# Patient Record
Sex: MALE | URBAN - METROPOLITAN AREA
[De-identification: names, ages, dates, MRNs, and addresses within clinical notes are randomized per-mention and may not be internally consistent; named-entity substitution may affect disease eponyms.]

---

## 2024-10-04 ENCOUNTER — ON CAMPUS - OUTPATIENT (AMBULATORY)
Age: 59
End: 2024-10-04
Payer: COMMERCIAL

## 2024-10-04 ENCOUNTER — OFFICE (AMBULATORY)
Age: 59
End: 2024-10-04
Payer: COMMERCIAL

## 2024-10-04 ENCOUNTER — OFFICE (AMBULATORY)
Dept: URBAN - METROPOLITAN AREA PATHOLOGY 19 | Facility: PATHOLOGY | Age: 59
End: 2024-10-04
Payer: COMMERCIAL

## 2024-10-04 ENCOUNTER — ON CAMPUS - OUTPATIENT (AMBULATORY)
Dept: URBAN - METROPOLITAN AREA HOSPITAL 2 | Facility: HOSPITAL | Age: 59
End: 2024-10-04
Payer: COMMERCIAL

## 2024-10-04 VITALS
HEIGHT: 70 IN | DIASTOLIC BLOOD PRESSURE: 87 MMHG | DIASTOLIC BLOOD PRESSURE: 84 MMHG | DIASTOLIC BLOOD PRESSURE: 90 MMHG | SYSTOLIC BLOOD PRESSURE: 141 MMHG | DIASTOLIC BLOOD PRESSURE: 94 MMHG | SYSTOLIC BLOOD PRESSURE: 141 MMHG | OXYGEN SATURATION: 98 % | DIASTOLIC BLOOD PRESSURE: 95 MMHG | HEART RATE: 95 BPM | SYSTOLIC BLOOD PRESSURE: 133 MMHG | HEART RATE: 69 BPM | OXYGEN SATURATION: 97 % | SYSTOLIC BLOOD PRESSURE: 140 MMHG | OXYGEN SATURATION: 94 % | HEART RATE: 93 BPM | DIASTOLIC BLOOD PRESSURE: 100 MMHG | HEART RATE: 69 BPM | SYSTOLIC BLOOD PRESSURE: 130 MMHG | DIASTOLIC BLOOD PRESSURE: 83 MMHG | SYSTOLIC BLOOD PRESSURE: 133 MMHG | SYSTOLIC BLOOD PRESSURE: 129 MMHG | WEIGHT: 315 LBS | DIASTOLIC BLOOD PRESSURE: 89 MMHG | HEART RATE: 96 BPM | DIASTOLIC BLOOD PRESSURE: 96 MMHG | DIASTOLIC BLOOD PRESSURE: 95 MMHG | HEART RATE: 89 BPM | DIASTOLIC BLOOD PRESSURE: 87 MMHG | SYSTOLIC BLOOD PRESSURE: 124 MMHG | HEART RATE: 98 BPM | DIASTOLIC BLOOD PRESSURE: 83 MMHG | HEART RATE: 98 BPM | RESPIRATION RATE: 20 BRPM | SYSTOLIC BLOOD PRESSURE: 154 MMHG | RESPIRATION RATE: 15 BRPM | RESPIRATION RATE: 19 BRPM | OXYGEN SATURATION: 94 % | HEART RATE: 96 BPM | HEIGHT: 70 IN | DIASTOLIC BLOOD PRESSURE: 93 MMHG | HEART RATE: 82 BPM | HEART RATE: 95 BPM | RESPIRATION RATE: 20 BRPM | RESPIRATION RATE: 19 BRPM | SYSTOLIC BLOOD PRESSURE: 154 MMHG | DIASTOLIC BLOOD PRESSURE: 93 MMHG | DIASTOLIC BLOOD PRESSURE: 94 MMHG | SYSTOLIC BLOOD PRESSURE: 141 MMHG | SYSTOLIC BLOOD PRESSURE: 135 MMHG | SYSTOLIC BLOOD PRESSURE: 144 MMHG | HEART RATE: 69 BPM | DIASTOLIC BLOOD PRESSURE: 94 MMHG | OXYGEN SATURATION: 96 % | RESPIRATION RATE: 17 BRPM | RESPIRATION RATE: 20 BRPM | SYSTOLIC BLOOD PRESSURE: 154 MMHG | DIASTOLIC BLOOD PRESSURE: 95 MMHG | HEART RATE: 95 BPM | SYSTOLIC BLOOD PRESSURE: 124 MMHG | SYSTOLIC BLOOD PRESSURE: 130 MMHG | HEART RATE: 90 BPM | RESPIRATION RATE: 16 BRPM | DIASTOLIC BLOOD PRESSURE: 92 MMHG | RESPIRATION RATE: 20 BRPM | SYSTOLIC BLOOD PRESSURE: 129 MMHG | DIASTOLIC BLOOD PRESSURE: 87 MMHG | RESPIRATION RATE: 15 BRPM | SYSTOLIC BLOOD PRESSURE: 151 MMHG | DIASTOLIC BLOOD PRESSURE: 92 MMHG | RESPIRATION RATE: 16 BRPM | RESPIRATION RATE: 19 BRPM | DIASTOLIC BLOOD PRESSURE: 84 MMHG | HEART RATE: 91 BPM | OXYGEN SATURATION: 97 % | HEART RATE: 91 BPM | HEART RATE: 95 BPM | SYSTOLIC BLOOD PRESSURE: 133 MMHG | HEART RATE: 91 BPM | HEART RATE: 86 BPM | OXYGEN SATURATION: 95 % | OXYGEN SATURATION: 95 % | OXYGEN SATURATION: 95 % | DIASTOLIC BLOOD PRESSURE: 95 MMHG | DIASTOLIC BLOOD PRESSURE: 90 MMHG | RESPIRATION RATE: 16 BRPM | DIASTOLIC BLOOD PRESSURE: 83 MMHG | HEART RATE: 69 BPM | OXYGEN SATURATION: 98 % | SYSTOLIC BLOOD PRESSURE: 139 MMHG | SYSTOLIC BLOOD PRESSURE: 151 MMHG | HEART RATE: 89 BPM | HEART RATE: 69 BPM | SYSTOLIC BLOOD PRESSURE: 144 MMHG | OXYGEN SATURATION: 98 % | DIASTOLIC BLOOD PRESSURE: 100 MMHG | DIASTOLIC BLOOD PRESSURE: 90 MMHG | HEART RATE: 96 BPM | SYSTOLIC BLOOD PRESSURE: 139 MMHG | SYSTOLIC BLOOD PRESSURE: 141 MMHG | RESPIRATION RATE: 15 BRPM | HEART RATE: 93 BPM | OXYGEN SATURATION: 96 % | DIASTOLIC BLOOD PRESSURE: 100 MMHG | TEMPERATURE: 97 F | RESPIRATION RATE: 18 BRPM | SYSTOLIC BLOOD PRESSURE: 124 MMHG | DIASTOLIC BLOOD PRESSURE: 96 MMHG | SYSTOLIC BLOOD PRESSURE: 141 MMHG | OXYGEN SATURATION: 97 % | HEART RATE: 82 BPM | OXYGEN SATURATION: 96 % | HEIGHT: 70 IN | HEART RATE: 89 BPM | HEART RATE: 98 BPM | SYSTOLIC BLOOD PRESSURE: 151 MMHG | HEART RATE: 91 BPM | HEART RATE: 82 BPM | OXYGEN SATURATION: 93 % | OXYGEN SATURATION: 94 % | DIASTOLIC BLOOD PRESSURE: 95 MMHG | DIASTOLIC BLOOD PRESSURE: 90 MMHG | DIASTOLIC BLOOD PRESSURE: 96 MMHG | RESPIRATION RATE: 20 BRPM | RESPIRATION RATE: 17 BRPM | RESPIRATION RATE: 19 BRPM | OXYGEN SATURATION: 96 % | OXYGEN SATURATION: 93 % | OXYGEN SATURATION: 98 % | RESPIRATION RATE: 17 BRPM | RESPIRATION RATE: 18 BRPM | OXYGEN SATURATION: 97 % | DIASTOLIC BLOOD PRESSURE: 93 MMHG | DIASTOLIC BLOOD PRESSURE: 84 MMHG | OXYGEN SATURATION: 95 % | SYSTOLIC BLOOD PRESSURE: 140 MMHG | HEART RATE: 89 BPM | DIASTOLIC BLOOD PRESSURE: 89 MMHG | DIASTOLIC BLOOD PRESSURE: 92 MMHG | RESPIRATION RATE: 14 BRPM | SYSTOLIC BLOOD PRESSURE: 129 MMHG | SYSTOLIC BLOOD PRESSURE: 130 MMHG | SYSTOLIC BLOOD PRESSURE: 151 MMHG | TEMPERATURE: 97 F | WEIGHT: 315 LBS | RESPIRATION RATE: 19 BRPM | SYSTOLIC BLOOD PRESSURE: 151 MMHG | DIASTOLIC BLOOD PRESSURE: 100 MMHG | HEART RATE: 86 BPM | HEART RATE: 95 BPM | SYSTOLIC BLOOD PRESSURE: 133 MMHG | RESPIRATION RATE: 14 BRPM | HEIGHT: 70 IN | RESPIRATION RATE: 20 BRPM | SYSTOLIC BLOOD PRESSURE: 135 MMHG | HEART RATE: 93 BPM | DIASTOLIC BLOOD PRESSURE: 82 MMHG | HEART RATE: 95 BPM | HEART RATE: 96 BPM | RESPIRATION RATE: 16 BRPM | TEMPERATURE: 97 F | HEART RATE: 93 BPM | HEART RATE: 89 BPM | RESPIRATION RATE: 18 BRPM | SYSTOLIC BLOOD PRESSURE: 140 MMHG | RESPIRATION RATE: 19 BRPM | OXYGEN SATURATION: 93 % | DIASTOLIC BLOOD PRESSURE: 100 MMHG | RESPIRATION RATE: 17 BRPM | HEART RATE: 89 BPM | DIASTOLIC BLOOD PRESSURE: 100 MMHG | WEIGHT: 315 LBS | SYSTOLIC BLOOD PRESSURE: 124 MMHG | HEIGHT: 70 IN | DIASTOLIC BLOOD PRESSURE: 100 MMHG | SYSTOLIC BLOOD PRESSURE: 124 MMHG | OXYGEN SATURATION: 97 % | DIASTOLIC BLOOD PRESSURE: 87 MMHG | DIASTOLIC BLOOD PRESSURE: 82 MMHG | SYSTOLIC BLOOD PRESSURE: 151 MMHG | DIASTOLIC BLOOD PRESSURE: 83 MMHG | SYSTOLIC BLOOD PRESSURE: 129 MMHG | DIASTOLIC BLOOD PRESSURE: 82 MMHG | TEMPERATURE: 97 F | RESPIRATION RATE: 18 BRPM | HEART RATE: 98 BPM | SYSTOLIC BLOOD PRESSURE: 144 MMHG | DIASTOLIC BLOOD PRESSURE: 83 MMHG | RESPIRATION RATE: 15 BRPM | SYSTOLIC BLOOD PRESSURE: 141 MMHG | DIASTOLIC BLOOD PRESSURE: 90 MMHG | SYSTOLIC BLOOD PRESSURE: 154 MMHG | OXYGEN SATURATION: 93 % | HEART RATE: 98 BPM | HEART RATE: 86 BPM | HEART RATE: 95 BPM | OXYGEN SATURATION: 93 % | RESPIRATION RATE: 15 BRPM | HEART RATE: 93 BPM | SYSTOLIC BLOOD PRESSURE: 140 MMHG | SYSTOLIC BLOOD PRESSURE: 130 MMHG | OXYGEN SATURATION: 95 % | OXYGEN SATURATION: 98 % | DIASTOLIC BLOOD PRESSURE: 87 MMHG | DIASTOLIC BLOOD PRESSURE: 82 MMHG | SYSTOLIC BLOOD PRESSURE: 133 MMHG | SYSTOLIC BLOOD PRESSURE: 135 MMHG | HEART RATE: 82 BPM | HEART RATE: 90 BPM | RESPIRATION RATE: 18 BRPM | HEART RATE: 82 BPM | SYSTOLIC BLOOD PRESSURE: 144 MMHG | DIASTOLIC BLOOD PRESSURE: 84 MMHG | SYSTOLIC BLOOD PRESSURE: 129 MMHG | SYSTOLIC BLOOD PRESSURE: 135 MMHG | DIASTOLIC BLOOD PRESSURE: 89 MMHG | HEART RATE: 90 BPM | TEMPERATURE: 97 F | HEART RATE: 90 BPM | HEART RATE: 69 BPM | HEART RATE: 86 BPM | HEART RATE: 82 BPM | WEIGHT: 315 LBS | WEIGHT: 315 LBS | OXYGEN SATURATION: 94 % | RESPIRATION RATE: 19 BRPM | SYSTOLIC BLOOD PRESSURE: 144 MMHG | SYSTOLIC BLOOD PRESSURE: 124 MMHG | RESPIRATION RATE: 20 BRPM | RESPIRATION RATE: 15 BRPM | SYSTOLIC BLOOD PRESSURE: 141 MMHG | DIASTOLIC BLOOD PRESSURE: 84 MMHG | SYSTOLIC BLOOD PRESSURE: 139 MMHG | DIASTOLIC BLOOD PRESSURE: 95 MMHG | OXYGEN SATURATION: 97 % | HEART RATE: 82 BPM | RESPIRATION RATE: 17 BRPM | TEMPERATURE: 97 F | OXYGEN SATURATION: 98 % | DIASTOLIC BLOOD PRESSURE: 84 MMHG | DIASTOLIC BLOOD PRESSURE: 90 MMHG | RESPIRATION RATE: 14 BRPM | DIASTOLIC BLOOD PRESSURE: 96 MMHG | HEART RATE: 96 BPM | DIASTOLIC BLOOD PRESSURE: 89 MMHG | DIASTOLIC BLOOD PRESSURE: 92 MMHG | WEIGHT: 315 LBS | SYSTOLIC BLOOD PRESSURE: 130 MMHG | RESPIRATION RATE: 14 BRPM | OXYGEN SATURATION: 93 % | HEART RATE: 93 BPM | DIASTOLIC BLOOD PRESSURE: 92 MMHG | SYSTOLIC BLOOD PRESSURE: 140 MMHG | SYSTOLIC BLOOD PRESSURE: 139 MMHG | DIASTOLIC BLOOD PRESSURE: 82 MMHG | DIASTOLIC BLOOD PRESSURE: 92 MMHG | HEART RATE: 69 BPM | RESPIRATION RATE: 16 BRPM | OXYGEN SATURATION: 94 % | DIASTOLIC BLOOD PRESSURE: 93 MMHG | SYSTOLIC BLOOD PRESSURE: 151 MMHG | TEMPERATURE: 97 F | DIASTOLIC BLOOD PRESSURE: 87 MMHG | DIASTOLIC BLOOD PRESSURE: 93 MMHG | HEART RATE: 86 BPM | HEART RATE: 96 BPM | RESPIRATION RATE: 14 BRPM | SYSTOLIC BLOOD PRESSURE: 139 MMHG | SYSTOLIC BLOOD PRESSURE: 140 MMHG | HEART RATE: 91 BPM | HEART RATE: 98 BPM | DIASTOLIC BLOOD PRESSURE: 82 MMHG | DIASTOLIC BLOOD PRESSURE: 94 MMHG | OXYGEN SATURATION: 98 % | RESPIRATION RATE: 17 BRPM | SYSTOLIC BLOOD PRESSURE: 130 MMHG | SYSTOLIC BLOOD PRESSURE: 140 MMHG | RESPIRATION RATE: 15 BRPM | DIASTOLIC BLOOD PRESSURE: 96 MMHG | DIASTOLIC BLOOD PRESSURE: 92 MMHG | SYSTOLIC BLOOD PRESSURE: 130 MMHG | SYSTOLIC BLOOD PRESSURE: 133 MMHG | HEART RATE: 86 BPM | HEART RATE: 98 BPM | DIASTOLIC BLOOD PRESSURE: 95 MMHG | SYSTOLIC BLOOD PRESSURE: 139 MMHG | HEART RATE: 89 BPM | OXYGEN SATURATION: 96 % | DIASTOLIC BLOOD PRESSURE: 94 MMHG | SYSTOLIC BLOOD PRESSURE: 133 MMHG | SYSTOLIC BLOOD PRESSURE: 144 MMHG | DIASTOLIC BLOOD PRESSURE: 89 MMHG | RESPIRATION RATE: 14 BRPM | DIASTOLIC BLOOD PRESSURE: 93 MMHG | HEIGHT: 70 IN | SYSTOLIC BLOOD PRESSURE: 154 MMHG | DIASTOLIC BLOOD PRESSURE: 96 MMHG | HEART RATE: 90 BPM | RESPIRATION RATE: 18 BRPM | RESPIRATION RATE: 14 BRPM | DIASTOLIC BLOOD PRESSURE: 94 MMHG | SYSTOLIC BLOOD PRESSURE: 135 MMHG | RESPIRATION RATE: 18 BRPM | DIASTOLIC BLOOD PRESSURE: 93 MMHG | SYSTOLIC BLOOD PRESSURE: 124 MMHG | DIASTOLIC BLOOD PRESSURE: 83 MMHG | OXYGEN SATURATION: 95 % | HEIGHT: 70 IN | DIASTOLIC BLOOD PRESSURE: 84 MMHG | WEIGHT: 315 LBS | DIASTOLIC BLOOD PRESSURE: 83 MMHG | SYSTOLIC BLOOD PRESSURE: 144 MMHG | HEART RATE: 96 BPM | OXYGEN SATURATION: 96 % | DIASTOLIC BLOOD PRESSURE: 96 MMHG | OXYGEN SATURATION: 95 % | SYSTOLIC BLOOD PRESSURE: 129 MMHG | HEART RATE: 93 BPM | DIASTOLIC BLOOD PRESSURE: 89 MMHG | SYSTOLIC BLOOD PRESSURE: 135 MMHG | DIASTOLIC BLOOD PRESSURE: 90 MMHG | SYSTOLIC BLOOD PRESSURE: 135 MMHG | SYSTOLIC BLOOD PRESSURE: 139 MMHG | HEART RATE: 90 BPM | SYSTOLIC BLOOD PRESSURE: 154 MMHG | OXYGEN SATURATION: 96 % | DIASTOLIC BLOOD PRESSURE: 87 MMHG | SYSTOLIC BLOOD PRESSURE: 129 MMHG | OXYGEN SATURATION: 93 % | OXYGEN SATURATION: 97 % | DIASTOLIC BLOOD PRESSURE: 89 MMHG | SYSTOLIC BLOOD PRESSURE: 154 MMHG | OXYGEN SATURATION: 94 % | HEART RATE: 90 BPM | RESPIRATION RATE: 16 BRPM | HEART RATE: 91 BPM | HEART RATE: 86 BPM | HEART RATE: 91 BPM | OXYGEN SATURATION: 94 % | DIASTOLIC BLOOD PRESSURE: 94 MMHG | RESPIRATION RATE: 16 BRPM | RESPIRATION RATE: 17 BRPM | DIASTOLIC BLOOD PRESSURE: 82 MMHG

## 2024-10-04 DIAGNOSIS — D12.2 BENIGN NEOPLASM OF ASCENDING COLON: ICD-10-CM

## 2024-10-04 DIAGNOSIS — D12.6 BENIGN NEOPLASM OF COLON, UNSPECIFIED: ICD-10-CM

## 2024-10-04 DIAGNOSIS — D12.8 BENIGN NEOPLASM OF RECTUM: ICD-10-CM

## 2024-10-04 DIAGNOSIS — Z12.11 ENCOUNTER FOR SCREENING FOR MALIGNANT NEOPLASM OF COLON: ICD-10-CM

## 2024-10-04 DIAGNOSIS — K57.30 DIVERTICULOSIS OF LARGE INTESTINE WITHOUT PERFORATION OR ABS: ICD-10-CM

## 2024-10-04 DIAGNOSIS — D12.3 BENIGN NEOPLASM OF TRANSVERSE COLON: ICD-10-CM

## 2024-10-04 DIAGNOSIS — K64.1 SECOND DEGREE HEMORRHOIDS: ICD-10-CM

## 2024-10-04 PROBLEM — K63.5 POLYP OF COLON: Status: ACTIVE | Noted: 2024-10-04

## 2024-10-04 LAB
GI HISTOLOGY: A. SPLENIC FLEXURE: (no result)
GI HISTOLOGY: B. ASCENDING COLON: (no result)
GI HISTOLOGY: C. TRANSVERSE COLON: (no result)
GI HISTOLOGY: E. RECTUM: (no result)
GI HISTOLOGY: PDF REPORT: (no result)
Lab: (no result)

## 2024-10-04 PROCEDURE — 45390 COLONOSCOPY W/RESECTION: CPT | Mod: 33,59 | Performed by: INTERNAL MEDICINE

## 2024-10-04 PROCEDURE — 88305 TISSUE EXAM BY PATHOLOGIST: CPT | Performed by: PATHOLOGY

## 2024-10-04 PROCEDURE — 45385 COLONOSCOPY W/LESION REMOVAL: CPT | Mod: 33 | Performed by: INTERNAL MEDICINE

## 2024-10-04 PROCEDURE — 45390 COLONOSCOPY W/RESECTION: CPT | Mod: 59,33 | Performed by: INTERNAL MEDICINE

## 2025-02-18 ENCOUNTER — PRE-ADMISSION TESTING (OUTPATIENT)
Dept: PREADMISSION TESTING | Facility: HOSPITAL | Age: 60
End: 2025-02-18
Payer: COMMERCIAL

## 2025-02-18 ENCOUNTER — HOSPITAL ENCOUNTER (OUTPATIENT)
Dept: GENERAL RADIOLOGY | Facility: HOSPITAL | Age: 60
Discharge: HOME | End: 2025-02-18
Payer: COMMERCIAL

## 2025-02-18 VITALS
DIASTOLIC BLOOD PRESSURE: 90 MMHG | HEIGHT: 70 IN | WEIGHT: 315 LBS | SYSTOLIC BLOOD PRESSURE: 166 MMHG | RESPIRATION RATE: 20 BRPM | HEART RATE: 109 BPM | BODY MASS INDEX: 45.1 KG/M2 | OXYGEN SATURATION: 96 % | TEMPERATURE: 98.6 F

## 2025-02-18 LAB
ALBUMIN SERPL-MCNC: 3.7 G/DL (ref 3.5–5.2)
ALBUMIN/GLOB SERPL: 1 G/DL
ALP SERPL-CCNC: 80 U/L (ref 39–117)
ALT SERPL W P-5'-P-CCNC: 38 U/L (ref 1–41)
ANION GAP SERPL CALCULATED.3IONS-SCNC: 13.7 MMOL/L (ref 5–15)
AST SERPL-CCNC: 36 U/L (ref 1–40)
BACTERIA UR QL AUTO: NORMAL /HPF
BILIRUB SERPL-MCNC: 0.3 MG/DL (ref 0–1.2)
BILIRUB UR QL STRIP: NEGATIVE
BUN SERPL-MCNC: 12 MG/DL (ref 8–23)
BUN/CREAT SERPL: 12.1 (ref 7–25)
CALCIUM SPEC-SCNC: 9.5 MG/DL (ref 8.6–10.5)
CHLORIDE SERPL-SCNC: 103 MMOL/L (ref 98–107)
CLARITY UR: CLEAR
CO2 SERPL-SCNC: 24.3 MMOL/L (ref 22–29)
COLOR UR: YELLOW
CREAT SERPL-MCNC: 0.99 MG/DL (ref 0.76–1.27)
DEPRECATED RDW RBC AUTO: 42.9 FL (ref 37–54)
EGFRCR SERPLBLD CKD-EPI 2021: 86.7 ML/MIN/1.73
ERYTHROCYTE [DISTWIDTH] IN BLOOD BY AUTOMATED COUNT: 13.9 % (ref 12.3–15.4)
GLOBULIN UR ELPH-MCNC: 3.7 GM/DL
GLUCOSE SERPL-MCNC: 78 MG/DL (ref 65–99)
GLUCOSE UR STRIP-MCNC: ABNORMAL MG/DL
HCT VFR BLD AUTO: 40.3 % (ref 37.5–51)
HGB BLD-MCNC: 13.6 G/DL (ref 13–17.7)
HGB UR QL STRIP.AUTO: NEGATIVE
HYALINE CASTS UR QL AUTO: NORMAL /LPF
KETONES UR QL STRIP: NEGATIVE
LEUKOCYTE ESTERASE UR QL STRIP.AUTO: NEGATIVE
MCH RBC QN AUTO: 28.8 PG (ref 26.6–33)
MCHC RBC AUTO-ENTMCNC: 33.7 G/DL (ref 31.5–35.7)
MCV RBC AUTO: 85.2 FL (ref 79–97)
NITRITE UR QL STRIP: NEGATIVE
PH UR STRIP.AUTO: 6 [PH] (ref 5–8)
PLATELET # BLD AUTO: 204 10*3/MM3 (ref 140–450)
PMV BLD AUTO: 10.5 FL (ref 6–12)
POTASSIUM SERPL-SCNC: 3.7 MMOL/L (ref 3.5–5.2)
PROT SERPL-MCNC: 7.4 G/DL (ref 6–8.5)
PROT UR QL STRIP: NEGATIVE
QT INTERVAL: 372 MS
QTC INTERVAL: 485 MS
RBC # BLD AUTO: 4.73 10*6/MM3 (ref 4.14–5.8)
RBC # UR STRIP: NORMAL /HPF
REF LAB TEST METHOD: NORMAL
SODIUM SERPL-SCNC: 141 MMOL/L (ref 136–145)
SP GR UR STRIP: 1.01 (ref 1–1.03)
SQUAMOUS #/AREA URNS HPF: NORMAL /HPF
UROBILINOGEN UR QL STRIP: ABNORMAL
WBC # UR STRIP: NORMAL /HPF
WBC NRBC COR # BLD AUTO: 10.91 10*3/MM3 (ref 3.4–10.8)

## 2025-02-18 PROCEDURE — 85027 COMPLETE CBC AUTOMATED: CPT

## 2025-02-18 PROCEDURE — 36415 COLL VENOUS BLD VENIPUNCTURE: CPT

## 2025-02-18 PROCEDURE — 71046 X-RAY EXAM CHEST 2 VIEWS: CPT

## 2025-02-18 PROCEDURE — 93010 ELECTROCARDIOGRAM REPORT: CPT | Performed by: STUDENT IN AN ORGANIZED HEALTH CARE EDUCATION/TRAINING PROGRAM

## 2025-02-18 PROCEDURE — 81001 URINALYSIS AUTO W/SCOPE: CPT | Performed by: ORTHOPAEDIC SURGERY

## 2025-02-18 PROCEDURE — 73030 X-RAY EXAM OF SHOULDER: CPT

## 2025-02-18 PROCEDURE — 93005 ELECTROCARDIOGRAM TRACING: CPT

## 2025-02-18 PROCEDURE — 80053 COMPREHEN METABOLIC PANEL: CPT

## 2025-02-18 RX ORDER — ASPIRIN 81 MG/1
81 TABLET ORAL DAILY
COMMUNITY

## 2025-02-18 RX ORDER — BETAMETHASONE DIPROPIONATE 0.5 MG/G
1 CREAM TOPICAL AS NEEDED
COMMUNITY
Start: 2024-12-23

## 2025-02-18 RX ORDER — PROCHLORPERAZINE 25 MG/1
SUPPOSITORY RECTAL
COMMUNITY
Start: 2025-02-05

## 2025-02-18 RX ORDER — SITAGLIPTIN 100 MG/1
100 TABLET, FILM COATED ORAL DAILY
COMMUNITY
Start: 2024-09-10

## 2025-02-18 RX ORDER — BISOPROLOL FUMARATE AND HYDROCHLOROTHIAZIDE 2.5; 6.25 MG/1; MG/1
1 TABLET ORAL 2 TIMES DAILY
COMMUNITY

## 2025-02-18 RX ORDER — ROPINIROLE 4 MG/1
4 TABLET, FILM COATED ORAL NIGHTLY
COMMUNITY

## 2025-02-18 RX ORDER — UBIDECARENONE 100 MG
200 CAPSULE ORAL DAILY
COMMUNITY

## 2025-02-18 RX ORDER — GLUCAGON 3 MG/1
3 POWDER NASAL AS NEEDED
COMMUNITY

## 2025-02-18 RX ORDER — LISINOPRIL 20 MG/1
20 TABLET ORAL DAILY
COMMUNITY

## 2025-02-18 RX ORDER — ALFUZOSIN HYDROCHLORIDE 10 MG/1
1 TABLET, EXTENDED RELEASE ORAL DAILY
COMMUNITY
Start: 2025-01-27

## 2025-02-18 RX ORDER — PROCHLORPERAZINE 25 MG/1
SUPPOSITORY RECTAL
COMMUNITY
Start: 2025-01-08

## 2025-02-18 RX ORDER — DAPAGLIFLOZIN AND METFORMIN HYDROCHLORIDE 5; 1000 MG/1; MG/1
1 TABLET, FILM COATED, EXTENDED RELEASE ORAL 2 TIMES DAILY
COMMUNITY
Start: 2024-10-20

## 2025-02-18 RX ORDER — INSULIN GLARGINE 300 U/ML
80 INJECTION, SOLUTION SUBCUTANEOUS EVERY EVENING
COMMUNITY

## 2025-02-18 RX ORDER — OMEPRAZOLE 40 MG/1
1 CAPSULE, DELAYED RELEASE ORAL DAILY
COMMUNITY
Start: 2024-12-11

## 2025-02-18 RX ORDER — LEVOTHYROXINE SODIUM 75 UG/1
75 TABLET ORAL
COMMUNITY

## 2025-02-18 RX ORDER — GLIMEPIRIDE 2 MG/1
2 TABLET ORAL
COMMUNITY

## 2025-02-18 RX ORDER — INSULIN ASPART 100 [IU]/ML
INJECTION, SOLUTION INTRAVENOUS; SUBCUTANEOUS
COMMUNITY
Start: 2025-01-10

## 2025-02-18 RX ORDER — DULAGLUTIDE 3 MG/.5ML
3 INJECTION, SOLUTION SUBCUTANEOUS WEEKLY
COMMUNITY
Start: 2024-12-23

## 2025-02-18 RX ORDER — ESCITALOPRAM OXALATE 20 MG/1
10 TABLET ORAL DAILY
COMMUNITY

## 2025-02-18 NOTE — DISCHARGE INSTRUCTIONS
Take the following medications the morning of surgery:  LEVOTHYROXINE, ESCITALOPRM, OMEPRAZOLE, BISOPROLOL/HCTZ    THE HOSPITAL WILL CALL YOU 1-2 DAYS PRIOR TO SURGERY WITH YOUR ARRIVAL TIME.      If you are on prescription narcotic pain medication to control your pain you may also take that medication the morning of surgery.      General Instructions:     Do not eat solid food after midnight the night before surgery.  Clear liquids day of surgery are allowed but must be stopped at least two hours before your hospital arrival time.       Allowed clear liquids      Water, sodas, and tea or coffee with no cream or milk added.       12 to 20 ounces of a clear liquid that contains carbohydrates is recommended.  If non-diabetic, have Gatorade or Powerade.  If diabetic, have G2 or Powerade Zero.     Do not have liquids red in color.  Do not consume chicken, beef, pork or vegetable broth or bouillon cubes of any variety as they are not considered clear liquids and are not allowed.      Infants may have breast milk up to four hours before surgery.  Infants drinking formula may drink formula up to six hours before surgery.   Patients who avoid smoking, chewing tobacco and alcohol for 4 weeks prior to surgery have a reduced risk of post-operative complications.  Quit smoking as many days before surgery as you can.  Do not smoke, use chewing tobacco or drink alcohol the day of surgery.   If applicable bring your C-PAP/ BI-PAP machine in with you to preop day of surgery.  Bring any papers given to you in the doctor’s office.  Wear clean comfortable clothes.  Do not wear contact lenses, false eyelashes or make-up.  Bring a case for your glasses.   Bring crutches or walker if applicable.  Remove all piercings.  Leave jewelry and any other valuables at home.  Hair extensions with metal clips must be removed prior to surgery.  The Pre-Admission Testing nurse will instruct you to bring medications if unable to obtain an accurate  list in Pre-Admission Testing.    Day of surgery you will need to let the preoperative nurse know the last time you took each of your medications.      If you were given a blood bank ID arm band remember to bring it with you the day of surgery.    Day of surgery:  Your arrival time is approximately two hours before your scheduled surgery time.  Upon arrival, a Pre-op nurse and Anesthesiologist will review your health history, obtain vital signs, and answer questions you may have.  The only belongings needed at this time will be a list of your home medications and if applicable your C-PAP/BI-PAP machine.  A Pre-op nurse will start an IV and you may receive medication in preparation for surgery, including something to help you relax.     Please be aware that surgery does come with discomfort.  We want to make every effort to control your discomfort so please discuss any uncontrolled symptoms with your nurse.   Your doctor will most likely have prescribed pain medications.      If you are going home after surgery you will receive individualized written care instructions before being discharged.  A responsible adult must drive you to and from the hospital on the day of your surgery and ideally stay with you through the night.  Discharge prescriptions can be filled by the hospital pharmacy during regular pharmacy hours.  If you are having surgery late in the day/evening your prescription may be e-prescribed to your pharmacy.  Please verify your pharmacy hours or chose a 24 hour pharmacy to avoid not having access to your prescription because your pharmacy has closed for the day.    If you are staying overnight following surgery, you will be transported to your hospital room following the recovery period.  [facility] has all private rooms.    If you have any questions please call Pre-Admission Testing at [phone].  Deductibles and co-payments are collected on the day of service. Please be prepared to  pay the required co-pay, deductible or deposit on the day of service as defined by your plan.    Call your surgeon immediately if you experience any of the following symptoms:  Sore Throat  Shortness of Breath or difficulty breathing  Cough  Chills  Body soreness or muscle pain  Headache  Fever  New loss of taste or smell  Do not arrive for your surgery ill.  Your procedure will need to be rescheduled to another time.  You will need to call your physician before the day of surgery to avoid any unnecessary exposure to hospital staff as well as other patients.        PREVENTING INFECTION IN SHOULDER SURGICAL SITES     C. acnes is a bacteria that lives deep within follicles and pores of the skin. It is found in large numbers on the skin of the face, axilla (armpit), chest and back and is the primary bacteria to cause a surgical site infection after shoulder surgery.      Use of a Benzoyl Peroxide solution prior to shoulder surgery decreases C. acnes and reduces post-op infections.   Your surgeon has ordered 5% Benzoyl Peroxide wash to be used three times prior to your surgery.     Please read the following instructions carefully and bring this form with you the day of surgery.     General bathing instructions starting two days before your surgery:    Shower using a fresh bar of anti-bacterial soap (such as Dial) and clean washcloth.  Pay special attention to the neck, shoulder and armpit area.   Wash your hair as usual with your regular shampoo.   Rinse hair and body thoroughly with warm water (not hot water) to remove shampoo and residue.   Dry with a clean towel.              Sleep in a clean bed with clean clothing.  Do not allow pets to sleep with you.     For 2 days before surgery, avoid shaving with a razor because the razor can irritate skin and make it easier to develop an infection.    Any areas of open skin can increase the risk of a post-operative wound infection by allowing bacteria to enter and travel  throughout the body.  Notify your surgeon if you have any skin wounds / rashes even if it is not near the expected surgical site.  The area will need assessed to determine if surgery should be delayed until it is healed.      First application of 5% Benzoyl Peroxide Wash two nights before surgery:                                                                Wash neck, shoulder (front, back, side) and armpit   with warm water, rinse and dry - see picture.  Gently wash the same areas with the Benzoyl Peroxide   cleanser going away from the neck for 10-20 seconds.   Work into a full lather and leave on the skin for   2 minutes for greatest effect.  Rinse thoroughly with warm water, not hot water.                     Pat dry with a clean towel.                                                            Wash your hands thoroughly.  Do not apply lotion, powder, perfume or deodorant.   Put on clean clothes.    Second application the night before surgery:  Repeat the above steps.        Third application morning of surgery:  Repeat the above steps.    Due to shoulder pain or decreased range of motion of your shoulder and arm, you may need assistance washing under the arm or the back portion of your shoulder.     Avoid further washing the areas of the skin treated with Benzoyl Peroxide for at least 1 hour.    For your convenience, you may purchase Benzoyl Peroxide at [email or web address] pharmacy.     Warning:  Let your physician know if you are allergic to Benzoyl Peroxide or have very sensitive skin and cannot use it.   Stop using and contact your surgeon if you experience any excessive scaling, itching, swelling, skin irritation or other signs of a reaction.  Keep out of eyes, ears, nose and mouth.  Do not apply to sunburned, irritated or broken area on the skin.  Avoid unwanted problems with bleaching effect by following these tips:  Wash hands after each use.  Avoid contact with hair, clothing, furnishings or  carpeting.  Wear clean, old t-shirt or clothing to bed.   Use clean, old white pillow cases and sheets to avoid discoloring your bed linens.                                                                                                                                                                                                                                                                                   Please complete the checklist below, bring it with you to the hospital                               the day of surgery and give to the Pre-op Nurse     Preoperative Skin Prep Checklist        Patient Name Label             Enter dates and ?  boxes to indicate completed    Surgery Date: _______________ Regular Shower  Benzoyl Peroxide Wash   First Application:  2 days before_______________  (Stop shaving all body parts)           Morning or Evening                        Evening    Second Application:  1 day  before________________        Morning or Evening                          Evening   Third Application:  Day of Surgery______________                           Morning                   Morning

## 2025-02-25 NOTE — H&P
HPI  Chief complaint left shoulder pain  History of present illness: Follow-up exam to review CT scan and MRI results for this gentleman prior to making a full surgical decision. He also was considering using an insurance plan that allows him to have the procedure free if he uses certain providers most of which are out of state. 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 The pain seemed to really get worse started in April 2023 and over the past year and a half he has had increasing stiffness, sharp pain, catching and weakness in the shoulder. Pain at rest is 3 out of 10 with activity 10 out of 10. He notices popping, grinding and decreased range of motion. He has done extensive physical therapy over an 8-week period of time and is use ice, heat, topical cream and modified activities but continues to be significantly symptomatic. He did have an injury where a tree limb landed on the shoulder at 1 point. He does have a history of diabetes mellitus and hypothyroidism as well.  Physical Exam  Left shoulder:  Skin is normal. There is no warmth. No erythema.  Lymphadenopathy is negative.  Shoulder passive ROM today shows: Elevation = 120; ER(side) = 20; ER(abd) = 60; IR(abd) = 20; IR(vert) = pocket.  Shoulder strength: Elevation = 4/5; ER = 4/5; IR = 5-/5; ABD = 4/5.  Crepitation in the glenohumeral joint. Arc of motion is positive with passive range of motion.  Pulses are normal. Normal sensation. Capillary refill is normal.  Tender over the anterior and posterior glenohumeral joint to palpation.  Increased pain with extended passive rotational movement  Assessment / Plan  CT scan left shoulder 12/17/2024 demonstrated Severe glenohumeral osteoarthritis with at least 13 degrees of retroversion per the radiologist.    MRI left shoulder 12/13/2024 images reviewed and independently interpreted and shared with patient demonstrate  partial-thickness tearing of the rotator cuff with severe glenohumeral osteoarthritis and large inferior humeral osteophytic spur with multiple loose bodies and significant tearing of the labrum.    Assessment:  1. Severe end-stage primary glenohumeral osteoarthritis left shoulder  2. Loose bodies left shoulder  3. Labral tears left shoulder  4. 13 degrees minimum retroversion glenoid  5. Elevated BMI of 46    Plan:  1. We discussed nutrition and weight loss.  2. Regarding his shoulder arthroplasty management is indicated and well a standard total shoulder or reverse total shoulder could be performed based on his overall physical condition and the fact that there is some damage to the rotator cuff and he has some significant retroversion it is my opinion a reverse total shoulder arthroplasty would be best for him.  3. He will assess his options and if I can help in any way I am happy to do so.    1. Osteoarthritis of left glenohumeral joint

## 2025-02-27 ENCOUNTER — ANESTHESIA EVENT (OUTPATIENT)
Dept: PERIOP | Facility: HOSPITAL | Age: 60
End: 2025-02-27
Payer: COMMERCIAL

## 2025-02-27 ENCOUNTER — HOSPITAL ENCOUNTER (OUTPATIENT)
Facility: HOSPITAL | Age: 60
Discharge: HOME | End: 2025-02-28
Attending: ORTHOPAEDIC SURGERY | Admitting: ORTHOPAEDIC SURGERY
Payer: COMMERCIAL

## 2025-02-27 ENCOUNTER — ANESTHESIA (OUTPATIENT)
Dept: PERIOP | Facility: HOSPITAL | Age: 60
End: 2025-02-27
Payer: COMMERCIAL

## 2025-02-27 ENCOUNTER — APPOINTMENT (OUTPATIENT)
Dept: GENERAL RADIOLOGY | Facility: HOSPITAL | Age: 60
End: 2025-02-27
Payer: COMMERCIAL

## 2025-02-27 DIAGNOSIS — Z96.612 STATUS POST REVERSE TOTAL SHOULDER REPLACEMENT, LEFT: Primary | ICD-10-CM

## 2025-02-27 LAB
GLUCOSE BLDC GLUCOMTR-MCNC: 188 MG/DL (ref 70–130)
GLUCOSE BLDC GLUCOMTR-MCNC: 202 MG/DL (ref 70–130)
GLUCOSE BLDC GLUCOMTR-MCNC: 295 MG/DL (ref 70–130)
GLUCOSE BLDC GLUCOMTR-MCNC: 78 MG/DL (ref 70–130)
GLUCOSE BLDC GLUCOMTR-MCNC: 81 MG/DL (ref 70–130)

## 2025-02-27 PROCEDURE — C1776 JOINT DEVICE (IMPLANTABLE): HCPCS | Performed by: ORTHOPAEDIC SURGERY

## 2025-02-27 PROCEDURE — 25010000002 SUGAMMADEX 200 MG/2ML SOLUTION: Performed by: REGISTERED NURSE

## 2025-02-27 PROCEDURE — 71045 X-RAY EXAM CHEST 1 VIEW: CPT

## 2025-02-27 PROCEDURE — 25810000003 LACTATED RINGERS PER 1000 ML: Performed by: ANESTHESIOLOGY

## 2025-02-27 PROCEDURE — 25010000002 VASOPRESSIN 20 UNIT/ML SOLUTION: Performed by: REGISTERED NURSE

## 2025-02-27 PROCEDURE — 94799 UNLISTED PULMONARY SVC/PX: CPT

## 2025-02-27 PROCEDURE — 25010000002 FENTANYL CITRATE (PF) 50 MCG/ML SOLUTION: Performed by: ANESTHESIOLOGY

## 2025-02-27 PROCEDURE — C1713 ANCHOR/SCREW BN/BN,TIS/BN: HCPCS | Performed by: ORTHOPAEDIC SURGERY

## 2025-02-27 PROCEDURE — 25010000002 BUPIVACAINE (PF) 0.5 % SOLUTION: Performed by: ANESTHESIOLOGY

## 2025-02-27 PROCEDURE — 25010000002 PROPOFOL 10 MG/ML EMULSION: Performed by: REGISTERED NURSE

## 2025-02-27 PROCEDURE — 63710000001 INSULIN LISPRO (HUMAN) PER 5 UNITS: Performed by: HOSPITALIST

## 2025-02-27 PROCEDURE — 25010000002 BUPIVACAINE LIPOSOME 1.3 % SUSPENSION: Performed by: ANESTHESIOLOGY

## 2025-02-27 PROCEDURE — 94761 N-INVAS EAR/PLS OXIMETRY MLT: CPT

## 2025-02-27 PROCEDURE — 25010000002 CEFAZOLIN PER 500 MG: Performed by: ORTHOPAEDIC SURGERY

## 2025-02-27 PROCEDURE — G0378 HOSPITAL OBSERVATION PER HR: HCPCS

## 2025-02-27 PROCEDURE — 25010000002 CEFAZOLIN 3 G RECONSTITUTED SOLUTION 1 EACH VIAL: Performed by: ORTHOPAEDIC SURGERY

## 2025-02-27 PROCEDURE — 25010000002 DEXAMETHASONE PER 1 MG: Performed by: REGISTERED NURSE

## 2025-02-27 PROCEDURE — 25010000002 ONDANSETRON PER 1 MG: Performed by: REGISTERED NURSE

## 2025-02-27 PROCEDURE — 94660 CPAP INITIATION&MGMT: CPT

## 2025-02-27 PROCEDURE — 25010000002 MIDAZOLAM PER 1 MG: Performed by: ANESTHESIOLOGY

## 2025-02-27 PROCEDURE — 25010000002 SUCCINYLCHOLINE PER 20 MG: Performed by: REGISTERED NURSE

## 2025-02-27 PROCEDURE — 82948 REAGENT STRIP/BLOOD GLUCOSE: CPT

## 2025-02-27 PROCEDURE — 73020 X-RAY EXAM OF SHOULDER: CPT

## 2025-02-27 PROCEDURE — 63710000001 INSULIN GLARGINE PER 5 UNITS: Performed by: HOSPITALIST

## 2025-02-27 PROCEDURE — 25010000002 LIDOCAINE 2% SOLUTION: Performed by: REGISTERED NURSE

## 2025-02-27 DEVICE — IMPLANTABLE DEVICE
Type: IMPLANTABLE DEVICE | Site: SHOULDER | Status: FUNCTIONAL
Brand: EQUINOXE

## 2025-02-27 DEVICE — HUMERAL LINER
Type: IMPLANTABLE DEVICE | Site: SHOULDER | Status: FUNCTIONAL
Brand: EQUINOXE®

## 2025-02-27 DEVICE — IMPLANTABLE DEVICE: Type: IMPLANTABLE DEVICE | Status: FUNCTIONAL

## 2025-02-27 DEVICE — IMPLANTABLE DEVICE: Type: IMPLANTABLE DEVICE | Site: SHOULDER | Status: FUNCTIONAL

## 2025-02-27 RX ORDER — SODIUM CHLORIDE, SODIUM LACTATE, POTASSIUM CHLORIDE, CALCIUM CHLORIDE 600; 310; 30; 20 MG/100ML; MG/100ML; MG/100ML; MG/100ML
9 INJECTION, SOLUTION INTRAVENOUS CONTINUOUS
Status: ACTIVE | OUTPATIENT
Start: 2025-02-27 | End: 2025-02-28

## 2025-02-27 RX ORDER — EPHEDRINE SULFATE 50 MG/ML
5 INJECTION, SOLUTION INTRAVENOUS ONCE AS NEEDED
Status: DISCONTINUED | OUTPATIENT
Start: 2025-02-27 | End: 2025-02-27 | Stop reason: HOSPADM

## 2025-02-27 RX ORDER — OXYCODONE AND ACETAMINOPHEN 5; 325 MG/1; MG/1
2 TABLET ORAL EVERY 4 HOURS PRN
Status: DISCONTINUED | OUTPATIENT
Start: 2025-02-27 | End: 2025-02-28 | Stop reason: HOSPADM

## 2025-02-27 RX ORDER — PANTOPRAZOLE SODIUM 40 MG/1
40 TABLET, DELAYED RELEASE ORAL
Status: DISCONTINUED | OUTPATIENT
Start: 2025-02-28 | End: 2025-02-28 | Stop reason: HOSPADM

## 2025-02-27 RX ORDER — HYDRALAZINE HYDROCHLORIDE 20 MG/ML
5 INJECTION INTRAMUSCULAR; INTRAVENOUS
Status: DISCONTINUED | OUTPATIENT
Start: 2025-02-27 | End: 2025-02-27 | Stop reason: HOSPADM

## 2025-02-27 RX ORDER — GLIPIZIDE 5 MG/1
5 TABLET ORAL
Status: DISCONTINUED | OUTPATIENT
Start: 2025-02-28 | End: 2025-02-28 | Stop reason: HOSPADM

## 2025-02-27 RX ORDER — FENTANYL CITRATE 50 UG/ML
50 INJECTION, SOLUTION INTRAMUSCULAR; INTRAVENOUS
Status: DISCONTINUED | OUTPATIENT
Start: 2025-02-27 | End: 2025-02-27 | Stop reason: HOSPADM

## 2025-02-27 RX ORDER — ASPIRIN 81 MG/1
81 TABLET ORAL DAILY
Status: DISCONTINUED | OUTPATIENT
Start: 2025-02-27 | End: 2025-02-28 | Stop reason: HOSPADM

## 2025-02-27 RX ORDER — NICOTINE POLACRILEX 4 MG
15 LOZENGE BUCCAL
Status: DISCONTINUED | OUTPATIENT
Start: 2025-02-27 | End: 2025-02-28 | Stop reason: HOSPADM

## 2025-02-27 RX ORDER — DEXMEDETOMIDINE HYDROCHLORIDE 100 UG/ML
INJECTION, SOLUTION INTRAVENOUS AS NEEDED
Status: DISCONTINUED | OUTPATIENT
Start: 2025-02-27 | End: 2025-02-27

## 2025-02-27 RX ORDER — NALOXONE HCL 0.4 MG/ML
0.1 VIAL (ML) INJECTION
Status: DISCONTINUED | OUTPATIENT
Start: 2025-02-27 | End: 2025-02-28 | Stop reason: HOSPADM

## 2025-02-27 RX ORDER — IPRATROPIUM BROMIDE AND ALBUTEROL SULFATE 2.5; .5 MG/3ML; MG/3ML
3 SOLUTION RESPIRATORY (INHALATION) ONCE AS NEEDED
Status: DISCONTINUED | OUTPATIENT
Start: 2025-02-27 | End: 2025-02-27 | Stop reason: HOSPADM

## 2025-02-27 RX ORDER — ALBUTEROL SULFATE 90 UG/1
INHALANT RESPIRATORY (INHALATION) AS NEEDED
Status: DISCONTINUED | OUTPATIENT
Start: 2025-02-27 | End: 2025-02-27 | Stop reason: SURG

## 2025-02-27 RX ORDER — FLUMAZENIL 0.1 MG/ML
0.2 INJECTION INTRAVENOUS AS NEEDED
Status: DISCONTINUED | OUTPATIENT
Start: 2025-02-27 | End: 2025-02-27 | Stop reason: HOSPADM

## 2025-02-27 RX ORDER — DEXMEDETOMIDINE HYDROCHLORIDE 100 UG/ML
INJECTION, SOLUTION INTRAVENOUS AS NEEDED
Status: DISCONTINUED | OUTPATIENT
Start: 2025-02-27 | End: 2025-02-27 | Stop reason: SURG

## 2025-02-27 RX ORDER — PROMETHAZINE HYDROCHLORIDE 25 MG/1
25 TABLET ORAL ONCE AS NEEDED
Status: DISCONTINUED | OUTPATIENT
Start: 2025-02-27 | End: 2025-02-27 | Stop reason: HOSPADM

## 2025-02-27 RX ORDER — ESCITALOPRAM OXALATE 10 MG/1
10 TABLET ORAL DAILY
Status: DISCONTINUED | OUTPATIENT
Start: 2025-02-28 | End: 2025-02-28 | Stop reason: HOSPADM

## 2025-02-27 RX ORDER — ONDANSETRON 2 MG/ML
4 INJECTION INTRAMUSCULAR; INTRAVENOUS ONCE AS NEEDED
Status: DISCONTINUED | OUTPATIENT
Start: 2025-02-27 | End: 2025-02-27 | Stop reason: HOSPADM

## 2025-02-27 RX ORDER — HYDROMORPHONE HYDROCHLORIDE 1 MG/ML
0.5 INJECTION, SOLUTION INTRAMUSCULAR; INTRAVENOUS; SUBCUTANEOUS
Status: DISCONTINUED | OUTPATIENT
Start: 2025-02-27 | End: 2025-02-28 | Stop reason: HOSPADM

## 2025-02-27 RX ORDER — SUCCINYLCHOLINE CHLORIDE 20 MG/ML
INJECTION INTRAMUSCULAR; INTRAVENOUS AS NEEDED
Status: DISCONTINUED | OUTPATIENT
Start: 2025-02-27 | End: 2025-02-27 | Stop reason: SURG

## 2025-02-27 RX ORDER — ATROPINE SULFATE 0.4 MG/ML
0.4 INJECTION, SOLUTION INTRAMUSCULAR; INTRAVENOUS; SUBCUTANEOUS ONCE AS NEEDED
Status: DISCONTINUED | OUTPATIENT
Start: 2025-02-27 | End: 2025-02-27 | Stop reason: HOSPADM

## 2025-02-27 RX ORDER — PROPOFOL 10 MG/ML
VIAL (ML) INTRAVENOUS AS NEEDED
Status: DISCONTINUED | OUTPATIENT
Start: 2025-02-27 | End: 2025-02-27 | Stop reason: SURG

## 2025-02-27 RX ORDER — TAMSULOSIN HYDROCHLORIDE 0.4 MG/1
0.4 CAPSULE ORAL NIGHTLY
Status: DISCONTINUED | OUTPATIENT
Start: 2025-02-27 | End: 2025-02-28 | Stop reason: HOSPADM

## 2025-02-27 RX ORDER — DEXTROSE MONOHYDRATE 25 G/50ML
25 INJECTION, SOLUTION INTRAVENOUS
Status: DISCONTINUED | OUTPATIENT
Start: 2025-02-27 | End: 2025-02-28 | Stop reason: HOSPADM

## 2025-02-27 RX ORDER — LABETALOL HYDROCHLORIDE 5 MG/ML
5 INJECTION, SOLUTION INTRAVENOUS
Status: DISCONTINUED | OUTPATIENT
Start: 2025-02-27 | End: 2025-02-27 | Stop reason: HOSPADM

## 2025-02-27 RX ORDER — BUPIVACAINE HYDROCHLORIDE 5 MG/ML
INJECTION, SOLUTION EPIDURAL; INTRACAUDAL
Status: COMPLETED | OUTPATIENT
Start: 2025-02-27 | End: 2025-02-27

## 2025-02-27 RX ORDER — ROCURONIUM BROMIDE 10 MG/ML
INJECTION, SOLUTION INTRAVENOUS AS NEEDED
Status: DISCONTINUED | OUTPATIENT
Start: 2025-02-27 | End: 2025-02-27 | Stop reason: SURG

## 2025-02-27 RX ORDER — FAMOTIDINE 10 MG/ML
20 INJECTION, SOLUTION INTRAVENOUS ONCE
Status: COMPLETED | OUTPATIENT
Start: 2025-02-27 | End: 2025-02-27

## 2025-02-27 RX ORDER — PROMETHAZINE HYDROCHLORIDE 25 MG/1
25 SUPPOSITORY RECTAL ONCE AS NEEDED
Status: DISCONTINUED | OUTPATIENT
Start: 2025-02-27 | End: 2025-02-27 | Stop reason: HOSPADM

## 2025-02-27 RX ORDER — LISINOPRIL 20 MG/1
20 TABLET ORAL DAILY
Status: DISCONTINUED | OUTPATIENT
Start: 2025-02-27 | End: 2025-02-28 | Stop reason: HOSPADM

## 2025-02-27 RX ORDER — IBUPROFEN 600 MG/1
1 TABLET ORAL
Status: DISCONTINUED | OUTPATIENT
Start: 2025-02-27 | End: 2025-02-28 | Stop reason: HOSPADM

## 2025-02-27 RX ORDER — INSULIN LISPRO 100 [IU]/ML
2-7 INJECTION, SOLUTION INTRAVENOUS; SUBCUTANEOUS
Status: DISCONTINUED | OUTPATIENT
Start: 2025-02-27 | End: 2025-02-27

## 2025-02-27 RX ORDER — DEXAMETHASONE SODIUM PHOSPHATE 4 MG/ML
INJECTION, SOLUTION INTRA-ARTICULAR; INTRALESIONAL; INTRAMUSCULAR; INTRAVENOUS; SOFT TISSUE AS NEEDED
Status: DISCONTINUED | OUTPATIENT
Start: 2025-02-27 | End: 2025-02-27 | Stop reason: SURG

## 2025-02-27 RX ORDER — ROPINIROLE 2 MG/1
4 TABLET, FILM COATED ORAL NIGHTLY
Status: DISCONTINUED | OUTPATIENT
Start: 2025-02-27 | End: 2025-02-28 | Stop reason: HOSPADM

## 2025-02-27 RX ORDER — FENTANYL CITRATE 50 UG/ML
50 INJECTION, SOLUTION INTRAMUSCULAR; INTRAVENOUS ONCE AS NEEDED
Status: COMPLETED | OUTPATIENT
Start: 2025-02-27 | End: 2025-02-27

## 2025-02-27 RX ORDER — MIDAZOLAM HYDROCHLORIDE 1 MG/ML
1 INJECTION, SOLUTION INTRAMUSCULAR; INTRAVENOUS
Status: DISCONTINUED | OUTPATIENT
Start: 2025-02-27 | End: 2025-02-27 | Stop reason: HOSPADM

## 2025-02-27 RX ORDER — HYDROCHLOROTHIAZIDE 12.5 MG/1
6.25 TABLET ORAL DAILY
Status: DISCONTINUED | OUTPATIENT
Start: 2025-02-27 | End: 2025-02-28 | Stop reason: HOSPADM

## 2025-02-27 RX ORDER — LIDOCAINE HYDROCHLORIDE 20 MG/ML
INJECTION, SOLUTION INFILTRATION; PERINEURAL AS NEEDED
Status: DISCONTINUED | OUTPATIENT
Start: 2025-02-27 | End: 2025-02-27 | Stop reason: SURG

## 2025-02-27 RX ORDER — ONDANSETRON 2 MG/ML
INJECTION INTRAMUSCULAR; INTRAVENOUS AS NEEDED
Status: DISCONTINUED | OUTPATIENT
Start: 2025-02-27 | End: 2025-02-27 | Stop reason: SURG

## 2025-02-27 RX ORDER — IBUPROFEN 600 MG/1
1 TABLET ORAL
Status: DISCONTINUED | OUTPATIENT
Start: 2025-02-27 | End: 2025-02-27

## 2025-02-27 RX ORDER — SODIUM CHLORIDE 450 MG/100ML
75 INJECTION, SOLUTION INTRAVENOUS CONTINUOUS
Status: DISCONTINUED | OUTPATIENT
Start: 2025-02-27 | End: 2025-02-28 | Stop reason: HOSPADM

## 2025-02-27 RX ORDER — LEVOTHYROXINE SODIUM 75 UG/1
75 TABLET ORAL
Status: DISCONTINUED | OUTPATIENT
Start: 2025-02-28 | End: 2025-02-28 | Stop reason: HOSPADM

## 2025-02-27 RX ORDER — NALOXONE HCL 0.4 MG/ML
0.2 VIAL (ML) INJECTION AS NEEDED
Status: DISCONTINUED | OUTPATIENT
Start: 2025-02-27 | End: 2025-02-27 | Stop reason: HOSPADM

## 2025-02-27 RX ORDER — ONDANSETRON 4 MG/1
4 TABLET, ORALLY DISINTEGRATING ORAL EVERY 6 HOURS PRN
Status: DISCONTINUED | OUTPATIENT
Start: 2025-02-27 | End: 2025-02-28 | Stop reason: HOSPADM

## 2025-02-27 RX ORDER — METFORMIN HYDROCHLORIDE 500 MG/1
1000 TABLET, EXTENDED RELEASE ORAL
Status: DISCONTINUED | OUTPATIENT
Start: 2025-02-28 | End: 2025-02-28 | Stop reason: HOSPADM

## 2025-02-27 RX ORDER — INSULIN LISPRO 100 [IU]/ML
4-24 INJECTION, SOLUTION INTRAVENOUS; SUBCUTANEOUS
Status: DISCONTINUED | OUTPATIENT
Start: 2025-02-27 | End: 2025-02-28 | Stop reason: HOSPADM

## 2025-02-27 RX ORDER — DIPHENHYDRAMINE HYDROCHLORIDE 50 MG/ML
12.5 INJECTION INTRAMUSCULAR; INTRAVENOUS
Status: DISCONTINUED | OUTPATIENT
Start: 2025-02-27 | End: 2025-02-27 | Stop reason: HOSPADM

## 2025-02-27 RX ORDER — SODIUM CHLORIDE 0.9 % (FLUSH) 0.9 %
3 SYRINGE (ML) INJECTION EVERY 12 HOURS SCHEDULED
Status: DISCONTINUED | OUTPATIENT
Start: 2025-02-27 | End: 2025-02-27 | Stop reason: HOSPADM

## 2025-02-27 RX ORDER — DIAZEPAM 5 MG/1
5 TABLET ORAL EVERY 6 HOURS PRN
Status: DISCONTINUED | OUTPATIENT
Start: 2025-02-27 | End: 2025-02-28 | Stop reason: HOSPADM

## 2025-02-27 RX ORDER — VASOPRESSIN 20 [USP'U]/ML
INJECTION, SOLUTION INTRAVENOUS AS NEEDED
Status: DISCONTINUED | OUTPATIENT
Start: 2025-02-27 | End: 2025-02-27 | Stop reason: SURG

## 2025-02-27 RX ORDER — SODIUM CHLORIDE 0.9 % (FLUSH) 0.9 %
3-10 SYRINGE (ML) INJECTION AS NEEDED
Status: DISCONTINUED | OUTPATIENT
Start: 2025-02-27 | End: 2025-02-27 | Stop reason: HOSPADM

## 2025-02-27 RX ORDER — OXYCODONE AND ACETAMINOPHEN 7.5; 325 MG/1; MG/1
1 TABLET ORAL EVERY 4 HOURS PRN
Status: DISCONTINUED | OUTPATIENT
Start: 2025-02-27 | End: 2025-02-28 | Stop reason: HOSPADM

## 2025-02-27 RX ORDER — BISOPROLOL FUMARATE 5 MG/1
2.5 TABLET, FILM COATED ORAL DAILY
Status: DISCONTINUED | OUTPATIENT
Start: 2025-02-28 | End: 2025-02-28 | Stop reason: HOSPADM

## 2025-02-27 RX ORDER — DEXMEDETOMIDINE HYDROCHLORIDE 4 UG/ML
INJECTION, SOLUTION INTRAVENOUS CONTINUOUS PRN
Status: DISCONTINUED | OUTPATIENT
Start: 2025-02-27 | End: 2025-02-27 | Stop reason: SURG

## 2025-02-27 RX ORDER — LIDOCAINE HYDROCHLORIDE 10 MG/ML
0.5 INJECTION, SOLUTION INFILTRATION; PERINEURAL ONCE AS NEEDED
Status: DISCONTINUED | OUTPATIENT
Start: 2025-02-27 | End: 2025-02-27 | Stop reason: HOSPADM

## 2025-02-27 RX ORDER — ONDANSETRON 2 MG/ML
4 INJECTION INTRAMUSCULAR; INTRAVENOUS EVERY 6 HOURS PRN
Status: DISCONTINUED | OUTPATIENT
Start: 2025-02-27 | End: 2025-02-28 | Stop reason: HOSPADM

## 2025-02-27 RX ADMIN — ASPIRIN 81 MG: 81 TABLET, COATED ORAL at 21:58

## 2025-02-27 RX ADMIN — SODIUM CHLORIDE 3000 MG: 900 INJECTION INTRAVENOUS at 21:57

## 2025-02-27 RX ADMIN — FAMOTIDINE 20 MG: 10 INJECTION INTRAVENOUS at 07:27

## 2025-02-27 RX ADMIN — LISINOPRIL 20 MG: 20 TABLET ORAL at 21:58

## 2025-02-27 RX ADMIN — SUGAMMADEX 200 MG: 100 INJECTION, SOLUTION INTRAVENOUS at 10:40

## 2025-02-27 RX ADMIN — SODIUM CHLORIDE 3000 MG: 900 INJECTION INTRAVENOUS at 08:48

## 2025-02-27 RX ADMIN — TAMSULOSIN HYDROCHLORIDE 0.4 MG: 0.4 CAPSULE ORAL at 21:58

## 2025-02-27 RX ADMIN — SODIUM CHLORIDE 75 ML/HR: 450 INJECTION, SOLUTION INTRAVENOUS at 20:00

## 2025-02-27 RX ADMIN — DEXAMETHASONE SODIUM PHOSPHATE 16 MG: 4 INJECTION, SOLUTION INTRA-ARTICULAR; INTRALESIONAL; INTRAMUSCULAR; INTRAVENOUS; SOFT TISSUE at 09:15

## 2025-02-27 RX ADMIN — ONDANSETRON 4 MG: 2 INJECTION INTRAMUSCULAR; INTRAVENOUS at 09:15

## 2025-02-27 RX ADMIN — DEXMEDETOMIDINE HYDROCHLORIDE 1 MCG/KG/HR: 4 INJECTION, SOLUTION INTRAVENOUS at 11:12

## 2025-02-27 RX ADMIN — SODIUM CHLORIDE, POTASSIUM CHLORIDE, SODIUM LACTATE AND CALCIUM CHLORIDE: 600; 310; 30; 20 INJECTION, SOLUTION INTRAVENOUS at 09:44

## 2025-02-27 RX ADMIN — ROCURONIUM BROMIDE 50 MG: 10 INJECTION INTRAVENOUS at 09:15

## 2025-02-27 RX ADMIN — SUGAMMADEX 400 MG: 100 INJECTION, SOLUTION INTRAVENOUS at 10:30

## 2025-02-27 RX ADMIN — ROPINIROLE HYDROCHLORIDE 4 MG: 2 TABLET, FILM COATED ORAL at 21:58

## 2025-02-27 RX ADMIN — BUPIVACAINE 10 ML: 13.3 INJECTION, SUSPENSION, LIPOSOMAL INFILTRATION at 07:48

## 2025-02-27 RX ADMIN — ROCURONIUM BROMIDE 50 MG: 10 INJECTION INTRAVENOUS at 09:44

## 2025-02-27 RX ADMIN — FENTANYL CITRATE 50 MCG: 50 INJECTION, SOLUTION INTRAMUSCULAR; INTRAVENOUS at 07:41

## 2025-02-27 RX ADMIN — BUPIVACAINE HYDROCHLORIDE 10 ML: 5 INJECTION, SOLUTION EPIDURAL; INTRACAUDAL; PERINEURAL at 07:48

## 2025-02-27 RX ADMIN — LIDOCAINE HYDROCHLORIDE 100 MG: 20 INJECTION, SOLUTION INFILTRATION; PERINEURAL at 09:04

## 2025-02-27 RX ADMIN — VASOPRESSIN 3 UNITS: 20 INJECTION INTRAVENOUS at 09:22

## 2025-02-27 RX ADMIN — INSULIN LISPRO 12 UNITS: 100 INJECTION, SOLUTION INTRAVENOUS; SUBCUTANEOUS at 22:03

## 2025-02-27 RX ADMIN — INSULIN GLARGINE 80 UNITS: 100 INJECTION, SOLUTION SUBCUTANEOUS at 22:03

## 2025-02-27 RX ADMIN — SUCCINYLCHOLINE CHLORIDE 200 MG: 20 INJECTION, SOLUTION INTRAMUSCULAR; INTRAVENOUS; PARENTERAL at 09:04

## 2025-02-27 RX ADMIN — DEXMEDETOMIDINE HYDROCHLORIDE 20 MCG: 100 INJECTION, SOLUTION INTRAVENOUS at 11:12

## 2025-02-27 RX ADMIN — HYDROCHLOROTHIAZIDE 6.25 MG: 12.5 TABLET ORAL at 22:10

## 2025-02-27 RX ADMIN — ALBUTEROL SULFATE 6 PUFF: 90 AEROSOL, METERED RESPIRATORY (INHALATION) at 10:48

## 2025-02-27 RX ADMIN — SODIUM CHLORIDE, POTASSIUM CHLORIDE, SODIUM LACTATE AND CALCIUM CHLORIDE 9 ML/HR: 600; 310; 30; 20 INJECTION, SOLUTION INTRAVENOUS at 07:15

## 2025-02-27 RX ADMIN — MIDAZOLAM HYDROCHLORIDE 2 MG: 1 INJECTION, SOLUTION INTRAMUSCULAR; INTRAVENOUS at 07:41

## 2025-02-27 RX ADMIN — PROPOFOL 220 MG: 10 INJECTION, EMULSION INTRAVENOUS at 09:04

## 2025-02-27 NOTE — ANESTHESIA PREPROCEDURE EVALUATION
Anesthesia Evaluation     NPO Solid Status: > 8 hours  NPO Liquid Status: > 4 hours           Airway   Mallampati: II  Possible difficult intubation  Dental      Pulmonary    (+) ,sleep apnea  Cardiovascular     (+) hypertension, hyperlipidemia      Neuro/Psych  (+) syncope, psychiatric history  GI/Hepatic/Renal/Endo    (+) morbid obesity, GERD, diabetes mellitus, thyroid problem     Musculoskeletal     Abdominal    Substance History      OB/GYN          Other   arthritis,                 Anesthesia Plan    ASA 3     general     (CMAC as primary     Regional for [facility] PSR  )  intravenous induction     Anesthetic plan, risks, benefits, and alternatives have been provided, discussed and informed consent has been obtained with: patient.    CODE STATUS:

## 2025-02-27 NOTE — OP NOTE
Date: 2/27/2025  Time: 10:29 EST TOTAL SHOULDER REVERSE ARTHROPLASTY  Procedure Note    Haresh Rg  2/27/2025    Pre-op Diagnosis:    Primary Glenohumeral Osteoarthritis Left Shoulder    Post-op Diagnosis   Primary Glenohumeral Osteoarthritis Left Shoulder    Procedure:  Left  Reverse total shoulder arthroplasty     Surgeon: Francis Wheat M.D.    Surgical assistant: ROSALIND NOGUERA      Anesthesia: General with Block  Anesthesiologist: Ba Barba MD  CRNA: Salina Cox CRNA    Staff:   Circulator: Kera Hopkins RN  Scrub Person: Enrico Jenkins  Vendor Representative: Jag Long ([facility])  Assistant: Rosalind Noguera APRN    Indication for Asst.  A surgical assistant, ROSALIND NOGUERA, was utilized as a medical necessity and was present through the entire procedure allowing safe completion of the procedure by helping with exposure and placement of implants as well as reducing overall operative time and morbidity for the patient.    Estimated Blood Loss: 200 ml    Specimens: * No orders in the log *    Complications: None     Implant specifics:  Implant Name Type Inv. Item Serial No.  Lot No. LRB No. Used Action   SCRW EQUINOXE TORQ DEFINE REV SHLDR KT - [id number] - [id number] Implant SCRW EQUINOXE TORQ DEFINE REV SHLDR KT [id number] [employer]  Left 1 Implanted   SCRW LK EQUINOXE GLENOSPHERE REV/SHLDR - [id number] - [id number] Implant SCRW LK EQUINOXE GLENOSPHERE REV/SHLDR [id number] [employer]  Left 1 Implanted   PLT ALFRED JNT EQUINOXE AUG POST 8DEG LT - [id number] - [id number] Implant PLT ALFRED JNT EQUINOXE AUG POST 8DEG LT [id number] [employer]  Left 1 Implanted   GLENOSPHERE SHLDR/REV EQUINOXE W/CP/LK/EXT 42MM - [id number] - [id number] Implant GLENOSPHERE SHLDR/REV EQUINOXE W/CP/LK/EXT 42MM [id number] [employer]  Left 1 Implanted   SCRW COMPR EQUINOXE LK 4.5X26MM - [id number] -  [id number] Implant SCRW COMPR EQUINOXE LK 4.5X26MM [id number] [employer]  Left 1 Implanted   SCRW COMPR EQUINOXE LK 4.5X30MM - [id number] - [id number] Implant SCRW COMPR EQUINOXE LK 4.5X30MM [id number] [employer]  Left 1 Implanted   SCRW COMPR EQUINOXE LK 4.5X30MM - [id number] - [id number] Implant SCRW COMPR EQUINOXE LK 4.5X30MM [id number] [employer]  Left 1 Implanted   SCRW COMPR EQUINOXE LK 4.5X34MM - [id number] - [id number] Implant SCRW COMPR EQUINOXE LK 4.5X34MM [id number] [employer]  Left 1 Implanted   STEM HUM MIKE EQUINOXE PRESSFIT 10MM SHT - [id number] - [id number] Implant STEM HUM MIKE EQUINOXE PRESSFIT 10MM SHT [id number] [employer]  Left 1 Implanted   COMP FEM/KN PERSONA REV CMT COCR SZ9 STD RT - [id number] - [id number] Implant COMP FEM/KN PERSONA REV CMT COCR SZ9 STD RT [id number] [employer]  Left 1 Implanted   LINER HUM/SHLDR EQUINOXE/REV PE 145DEG 42MM PLS0 - [id number] - [id number] Implant LINER HUM/SHLDR EQUINOXE/REV PE 145DEG 42MM PLS0 [id number] [employer]  Left 1 Implanted       SURGICAL APPROACH: Deltopectoral      SURGICAL TECHNIQUE: Peel off         Procedure: The patient was taken to the operative suite.  After adequate anesthesia was established the upper extremity was prepped and draped in the usual fashion.  The head was placed in a neutral position and bony prominences were padded.  Ioban was utilized covering the skin over the shoulder and axilla.  Preoperative antibiotics and transexamic acid were confirmed.  An anterior incision was made starting lateral to the coracoid towards the axillary crease through skin and subcutaneous tissues.  The deltopectoral interval was entered.  The cephalic vein was identified, preserved, and retracted laterally.  The conjoined tendon was reflected medially.  The biceps tendon was identified near the pectoralis insertion site.  A small portion of the  pectoralis tendon was released and then the biceps tendon was tenodesed to the pectoralis tendon insertion on the humeral.  The biceps tendon was then tenotomized more proximally.  The subscapularis was peeled off the lesser tuberosity and tagged.  Arthropathic changes were noted in the glenohumeral joint.  The rotator cuff was intact but severely contracted due to an internal rotation contracture with the severe osteoarthritic change and inferior osteophytic spurs on the humeral head. The capsule was released off the humeral neck and the posterior soft tissue attachments were protected. An external cutting guide was utilized and the head was cut at a 20° retroverted angle.  Excess osteophytes were excised with a rongeur.  Sequential impaction broaching was carried out using the Preserve system broaches.  The final broach was left in place for later trialing.   I then visualized the glenoid and retractors were placed starting posteriorly and superiorly.  The capsule was reflected off the deep surface of the subscapularis.  An anterior retractor was placed.  The labrum was then circumferentially excised off of the glenoid.  The biceps stump was released and removed as well.  A careful capsular release off the inferior glenoid was performed protecting the axillary nerve and vasculature.  This exposed the scapular pillar.    This allowed for visualization of the glenoid.  The preoperative plan demonstrated 14 degrees retroversion that corrected well with an 8 degree posterior augmented baseplate.  A guide was used to drill a K wire through the central aspect of the glenoid.   Sequential reaming was carried out at the prescribed angle and to the prescribed depth based on the preoperative plan.   A central hole was then drilled for the cage using a guide.  This assured that we were within the vault of the glenoid and preserved as much bone as possible during reaming.    Autogenous bone graft was harvested from the  humeral head and packed into the cage of the baseplate.  The baseplate was then compressed onto the glenoid.  Rotation was checked.  Drill holes were then placed for compression screws through the baseplate.  These were then placed to the appropriate depth with good compression and then locking caps were placed over the screw heads.  Good baseplate placement and stable fixation were noted.  An appropriate size glenosphere based on the preoperative plan and intraoperative visualization of the anatomy was chosen and fixed to the baseplate with a central compression screw.  I then trialed the humeral tray and polyethylene.  I was looking for smooth nonimpinged range of motion.  I also wanted to assure good stability by placing traction on the arm and also checking for lateral stability.  Once satisfied with range of motion and stability I removed the humeral components.  I pulse lavaged the proximal humerus and press-fit the appropriate size stem with good purchase and fixation.  Next I re-trialed the humeral tray and humeral liner.  When I was satisfied with range of motion and stability I removed the trial liner and tray.  The appropriate humeral tray was then placed and held with a torque limiting screw.  The chosen polyethylene was then inserted and compressed into place and the shoulder was reduced.  Subdeltoid scar tissue was excised.  Good range of motion was noted.  Good stability was noted.  Vigorous irrigation and suctioning was performed.  The subscapularis was not repaired as it was too tight and contracted.  The deltopectoral interval was closed with 0 Vicryl.  The subcutaneous tissues were closed with 2-0 Vicryl.  The skin was closed with a zip line device.  The patient had a sterile compression dressing applied and was awoken and taken to the postanesthetic recovery unit in good condition.    Francis Wheat MD     Date: 2/27/2025  Time: 10:29 EST

## 2025-02-27 NOTE — PERIOPERATIVE NURSING NOTE
Pt. C/o tingling both hands. Accuc[place]k 78. Stated that he runs low at times and does not treat at home. Dr. Cleary notified. No orders.

## 2025-02-27 NOTE — DISCHARGE INSTRUCTIONS
Dr. Francis Wheat  [address] [address]  [phone]    Outpatient REVERSE TOTAL SHOULDER REPLACEMENT  HOSPITAL DISCHARGE INSTRUCTIONS     GENERAL INFORMATION: With improved surgical techniques for total shoulder and reverse total shoulder replacement and the Jehovah's witness of ultrasound guided long acting nerve blocks, the hospital stay for patients has decreased significantly.  Many people can go home right after surgery as an outpatient.    SPECIFIC POST-OP INSTRUCTIONS:  * FOLLOW UP APPOINTMENT: You should have been given an appointment to follow up 10-14 days after your date of surgery. We can see you back sooner if there are any problems or concerns.   * BLOOD THINNERS: All patients will be on some type of blood thinner post-op to prevent blood clot. Most patients who are not already on a blood thinner are discharged on over-the-counter aspirin 81 mg or 325mg daily which you will take for three weeks. If you were taking a blood thinner prior to surgery, we will have you resume this on the first day post-op.   * ICE: Ice helps to decrease both pain and swelling. Ice should be applied to the shoulder 20-25 minutes each hour while awake.    DRESSING CHANGES: We ask that you keep the incision clean and dry.  Your surgical dressing can be removed 48 hours after surgery.  There will be a yellow strip of gauze and a Zipline device that will be underneath the dressing.  The yellow gauze can be removed but leave the Zipline alone.  You can then apply a watertight dressing over the Zipline to protect it.  You can get this type of dressing from the hospital before you leave or at your local pharmacy. SHOWERING: The wound edges typically come together and seal by 3-5 days post-op if there is no drainage. Again, please keep the same waterproof dressing on. DO NOT SUBMERSE SHOULDER IN POOL,HOT TUB OR BATH UNTIL AT LEAST 3-4 WEEKS POST-OP (EVEN WITH WATERPROOF BANDAIDS).  * PAIN  MEDICINE: You will be given a prescription  for oral pain medication prior to discharge from the hospital. Please let us know if you have a sensitivity to certain pain medications prior to discharge. Additional pain medication prescriptions can be called into your pharmacy during normal business hours. NO medications can be called in over the weekends or after business hours.   * ORAL ANTI-INFLAMMATORIES:   You will be asked to discontinue ALL oral anti-inflammatories prior to surgery. You can resume these the first day post-op.These can be combined with the oral pain medications safely. DO NOT TAKE ADDITIONAL TYLENOL WITH THE NARCOTIC PAIN MEDICATION (it already has Tylenol in it). If you were not taking an anti-inflammatory pre-op, you can start one on the first day post-op. It will help decrease pain and swelling. Typical medications and dosages are as follows:   Advil/Motrin/Ibuprofen 200mg, 4 pills every 8 hours   Aleve/Naproxen Sodium 220mg, 2 pills every 12 hours  * SLING: We recommend you wear the sling at all times, other than when showering or doing physical therapy exercises.    * WEIGHT BEARING: We ask that you be non-weight bearing on the operative shoulder. Do not use the operative arm to lift/push/pull greater than 5lbs.   * PHYSICAL THERAPY: Before you leave the hospital staff will teach you some very gentle range of motion exercises to do at home for the first 10-14 days. After we see you in the office during your first post-op visit, we will give you a prescription to start formal physical therapy. This can be done downstairs at [facility] or at a location of your choice. Physical therapy is typically 2-3 times a week for 4-6 weeks, depending on the individual and their progress.   * DRIVING A CAR: Medically/legally we can not recommend you drive a car while in the sling or while on pain medication (roughly 10-14 days).   * RETURNING TO DAILY AND RECREATIONAL ACTIVITIES: For the most part patients can progress to daily activities as tolerated  "(keeping in mind the restrictions listed above). Initially, we do not want you to \"overdo\" it in an attempt to minimize post-op pain and swelling. Once the swelling is controlled, you can progress with activities as tolerated. Pain and swelling should be your guide to increasing your activity level.   * RETURN TO WORK: The return to work date depends on many factors and is very dependent on the individual. You would most likely be able to return to a sedentary job after your first post-op visit (10-14 days after surgery). A more physical job would obviously require a longer recovery time before return to work.  "

## 2025-02-27 NOTE — ANESTHESIA POSTPROCEDURE EVALUATION
Patient: Haresh Rg    Procedure Summary       Date: 02/27/25 Room / Location:  [facility] [address] [place] [address] [facility]    Anesthesia Start: 0854 Anesthesia Stop: 1135    Procedure: LEFT REVERSE TOTAL SHOULDER ARTHROPLASTY (Left: Shoulder) Diagnosis:     Surgeons: Francis Wheat MD Provider: Ba Barba MD    Anesthesia Type: general ASA Status: 3            Anesthesia Type: general    Vitals  Vitals Value Taken Time   /53 02/27/25 1330   Temp     Pulse 110 02/27/25 1337   Resp 20 02/27/25 1330   SpO2 89 % 02/27/25 1337   Vitals shown include unfiled device data.        Post Anesthesia Care and Evaluation    Patient location during evaluation: PACU  Patient participation: complete - patient participated  Level of consciousness: awake  Pain management: adequate    Airway patency: patent  Anesthetic complications: No anesthetic complications  PONV Status: none  Cardiovascular status: acceptable  Respiratory status: acceptable  Hydration status: acceptable

## 2025-02-27 NOTE — PLAN OF CARE
Goal Outcome Evaluation:  Plan of Care Reviewed With: spouse        Progress: improving  Outcome Evaluation: VSS, Pt on 6 L high flow nc, AOx4, ad gladis in room, due to void       Pt on RA and maintaining >90 o2 sat.  Pt had both void and BM

## 2025-02-27 NOTE — ANESTHESIA PROCEDURE NOTES
Peripheral Block      Patient reassessed immediately prior to procedure    Patient location during procedure: pre-op  Start time: 2/27/2025 7:40 AM  Stop time: 2/27/2025 7:48 AM  Reason for block: procedure for pain, at surgeon's request and post-op pain management  Performed by  Anesthesiologist: Ba Barba MD  Preanesthetic Checklist  Completed: patient identified, IV checked, site marked, risks and benefits discussed, surgical consent, monitors and equipment checked, pre-op evaluation and timeout performed  Prep:  Pt Position: supine  Sterile barriers:cap, gloves, sterile barriers, washed/disinfected hands and mask  Prep: ChloraPrep  Patient monitoring: blood pressure monitoring, continuous pulse oximetry and EKG  Procedure    Sedation: yes  Performed under: local infiltration  Guidance:ultrasound guided  Images:still images obtained, printed/placed on chart    Laterality:left  Block Type:interscalene  Injection Technique:single-shot  Needle Type:echogenic  Resistance on Injection: none    Medications Used: bupivacaine liposome (EXPAREL) 1.3 % injection - Infiltration   10 mL - 2/27/2025 7:48:00 AM  bupivacaine (PF) (MARCAINE) 0.5 % injection - Injection   10 mL - 2/27/2025 7:48:00 AM      Post Assessment  Injection Assessment: negative aspiration for heme, no paresthesia on injection and incremental injection  Patient Tolerance:comfortable throughout block  Complications:no  Additional Notes  USG used to verify needle placement and medication administration     Performed by: Ba Barba MD

## 2025-02-27 NOTE — ANESTHESIA PROCEDURE NOTES
Airway  Urgency: elective    Date/Time: 2/27/2025 9:06 AM  Airway not difficult    General Information and Staff    Patient location during procedure: OR  CRNA/CAA: Salina Cox CRNA    Indications and Patient Condition  Indications for airway management: airway protection    Preoxygenated: yes  MILS maintained throughout  Mask difficulty assessment: 2 - vent by mask + OA or adjuvant +/- NMBA    Final Airway Details  Final airway type: endotracheal airway      Successful airway: ETT  Cuffed: yes   Successful intubation technique: video laryngoscopy  Facilitating devices/methods: intubating stylet  Endotracheal tube insertion site: oral  Blade: CMAC  Blade size: D  ETT size (mm): 8.0  Cormack-Lehane Classification: grade I - full view of glottis  Placement verified by: chest auscultation and capnometry   Measured from: lips  ETT/EBT  to lips (cm): 22  Number of attempts at approach: 1  Assessment: lips, teeth, and gum same as pre-op and atraumatic intubation    Additional Comments  Atraumatic insertion

## 2025-02-27 NOTE — OP NOTE
Date: 2/27/2025  Time: 09:02 EST TOTAL SHOULDER REVERSE ARTHROPLASTY  Procedure Note    Haresh Rg  2/27/2025    Pre-op Diagnosis:    Rotator Cuff Arthropathy Right Shoulder    Post-op Diagnosis   Rotator Cuff Arthropathy Right Shoulder    Procedure:  Left  Reverse total shoulder arthroplasty     Surgeon: Francis Wheat M.D.    Surgical assistant:       Anesthesia: General with Block  CRNA: Salina Cox CRNA    Staff:   * No surgical staff found *    Indication for Asst.  A surgical assistant, , was utilized as a medical necessity and was present through the entire procedure allowing safe completion of the procedure by helping with exposure and placement of implants as well as reducing overall operative time and morbidity for the patient.    Estimated Blood Loss: 200 ml    Specimens: * No orders in the log *    Complications: None     Implant specifics:Nothing was implanted during the procedure    SURGICAL APPROACH: Deltopectoral      SURGICAL TECHNIQUE: Peel off         Procedure: The patient was taken to the operative suite.  After adequate anesthesia was established the upper extremity was prepped and draped in the usual fashion.  The head was placed in a neutral position and bony prominences were padded.  Ioban was utilized covering the skin over the shoulder and axilla.  Preoperative antibiotics and transexamic acid were confirmed.  An anterior incision was made starting lateral to the coracoid towards the axillary crease through skin and subcutaneous tissues.  The deltopectoral interval was entered.  The cephalic vein was identified, preserved, and retracted laterally.  The conjoined tendon was reflected medially.  The biceps tendon was identified near the pectoralis insertion site.  A small portion of the pectoralis tendon was released and then the biceps tendon was tenodesed to the pectoralis tendon insertion on the humeral.  The biceps tendon was then tenotomized more proximally.  The  subscapularis was peeled off the lesser tuberosity and tagged.  Arthropathic changes were noted in the glenohumeral joint.  The rotator cuff was completely torn and retracted superiorly.The capsule was released off the humeral neck and the posterior soft tissue attachments were protected. An external cutting guide was utilized and the head was cut at a 20° retroverted angle.  Excess osteophytes were excised with a rongeur.  A starting reamer was placed into the proximal humeral canal.  Sequential impaction broaching was carried out until the broach was firmly fit within the bone and this final broach was left in place.  A head protector was placed to protect the proximal bone.  I then visualized the glenoid and retractors were placed starting posteriorly and superiorly.  The capsule was reflected off the deep surface of the subscapularis.  An anterior retractor was placed.  The labrum was then circumferentially excised off of the glenoid.  The biceps stump was released and removed as well.  A careful capsular release off the inferior glenoid was performed protecting the axillary nerve and vasculature.  This exposed the scapular pillar.    This allowed for visualization of the glenoid.  The preoperative plan demonstrated minimal retro-version and inclination changes.  Preop planning have been carried out and guides were used specific to his shoulder.  A guide was used to drill a K wire through the central aspect of the glenoid.  A boss hole was drilled to allow for appropriate reaming.  Sequential reaming was carried out at the prescribed angle and to the prescribed depth based on the preoperative plan.   A central hole was then drilled for the central peg using a guide.  The baseplate was then compressed onto the glenoid with appropriate rotation for deformity correction.  Drill holes were then placed for compression screws through the baseplate.  These were then placed to the appropriate depth with good compression  and then locked to secure fit.  Good baseplate placement and stable fixation were noted.  An appropriate size 39 mm glenosphere with +3 lateralization based on the preoperative plan and intraoperative visualization of the anatomy was chosen and fixed to the baseplate with taper fit followed by placement of a central compression screw.  The trial humeral components were removed.  Pulse lavage irrigation was carried out.  The short stem was compressed into position of the proximal humerus.  A PEG was placed for metaphyseal reaming.  The tray and polyethylene were trialed.   I was looking for smooth nonimpinged range of motion.  I also wanted to assure good stability by placing traction on the arm and also checking for lateral stability.  Once satisfied with range of motion and stability I removed the humeral components.  I pulse lavaged the proximal humerus.  The metaphyseal tray was placed and held with a central compression screw followed by placement of the polyethylene.  This was placed at a final 55 degree angle.   Subdeltoid scar tissue was excised.  Good range of motion was noted.  Good stability was noted.  Vigorous irrigation and suctioning was performed.  The subscapularis was repaired with 3 #5 nonabsorbable sutures passed through bone and the lesser tuberosity securely. The deltopectoral interval was closed with 0 Vicryl.  The subcutaneous tissues were closed with 2-0 Vicryl.  The skin was closed with a zip line device.  The patient had a sterile compression dressing applied and was awoken and taken to the postanesthetic recovery unit in good condition.    Francis Wheat MD     Date: 2/27/2025  Time: 09:02 EST

## 2025-02-28 VITALS
RESPIRATION RATE: 18 BRPM | TEMPERATURE: 97.3 F | HEART RATE: 80 BPM | BODY MASS INDEX: 44.1 KG/M2 | OXYGEN SATURATION: 96 % | DIASTOLIC BLOOD PRESSURE: 79 MMHG | SYSTOLIC BLOOD PRESSURE: 142 MMHG | HEIGHT: 71 IN | WEIGHT: 315 LBS

## 2025-02-28 LAB
ANION GAP SERPL CALCULATED.3IONS-SCNC: 13.6 MMOL/L (ref 5–15)
BUN SERPL-MCNC: 17 MG/DL (ref 8–23)
BUN/CREAT SERPL: 16.8 (ref 7–25)
CALCIUM SPEC-SCNC: 8.7 MG/DL (ref 8.6–10.5)
CHLORIDE SERPL-SCNC: 100 MMOL/L (ref 98–107)
CO2 SERPL-SCNC: 22.4 MMOL/L (ref 22–29)
CREAT SERPL-MCNC: 1.01 MG/DL (ref 0.76–1.27)
EGFRCR SERPLBLD CKD-EPI 2021: 84.6 ML/MIN/1.73
GLUCOSE BLDC GLUCOMTR-MCNC: 201 MG/DL (ref 70–130)
GLUCOSE BLDC GLUCOMTR-MCNC: 206 MG/DL (ref 70–130)
GLUCOSE SERPL-MCNC: 196 MG/DL (ref 65–99)
HCT VFR BLD AUTO: 36.1 % (ref 37.5–51)
HGB BLD-MCNC: 12 G/DL (ref 13–17.7)
POTASSIUM SERPL-SCNC: 4 MMOL/L (ref 3.5–5.2)
SODIUM SERPL-SCNC: 136 MMOL/L (ref 136–145)

## 2025-02-28 PROCEDURE — G0378 HOSPITAL OBSERVATION PER HR: HCPCS

## 2025-02-28 PROCEDURE — 80048 BASIC METABOLIC PNL TOTAL CA: CPT | Performed by: ORTHOPAEDIC SURGERY

## 2025-02-28 PROCEDURE — 82948 REAGENT STRIP/BLOOD GLUCOSE: CPT

## 2025-02-28 PROCEDURE — 25010000002 CEFAZOLIN 3 G RECONSTITUTED SOLUTION 1 EACH VIAL: Performed by: ORTHOPAEDIC SURGERY

## 2025-02-28 PROCEDURE — 63710000001 INSULIN LISPRO (HUMAN) PER 5 UNITS: Performed by: HOSPITALIST

## 2025-02-28 PROCEDURE — 97165 OT EVAL LOW COMPLEX 30 MIN: CPT

## 2025-02-28 PROCEDURE — 85018 HEMOGLOBIN: CPT | Performed by: ORTHOPAEDIC SURGERY

## 2025-02-28 PROCEDURE — 85014 HEMATOCRIT: CPT | Performed by: ORTHOPAEDIC SURGERY

## 2025-02-28 RX ORDER — OXYCODONE AND ACETAMINOPHEN 5; 325 MG/1; MG/1
2 TABLET ORAL EVERY 4 HOURS PRN
Qty: 30 TABLET | Refills: 0 | Status: SHIPPED | OUTPATIENT
Start: 2025-02-28 | End: 2025-03-04

## 2025-02-28 RX ORDER — ASPIRIN 81 MG/1
81 TABLET ORAL DAILY
Qty: 21 TABLET | Refills: 0 | Status: SHIPPED | OUTPATIENT
Start: 2025-02-28

## 2025-02-28 RX ADMIN — LEVOTHYROXINE SODIUM 75 MCG: 0.07 TABLET ORAL at 05:26

## 2025-02-28 RX ADMIN — BISOPROLOL FUMARATE 2.5 MG: 5 TABLET, FILM COATED ORAL at 08:30

## 2025-02-28 RX ADMIN — METFORMIN HYDROCHLORIDE 1000 MG: 500 TABLET, EXTENDED RELEASE ORAL at 08:29

## 2025-02-28 RX ADMIN — HYDROCHLOROTHIAZIDE 6.25 MG: 12.5 TABLET ORAL at 08:29

## 2025-02-28 RX ADMIN — INSULIN LISPRO 8 UNITS: 100 INJECTION, SOLUTION INTRAVENOUS; SUBCUTANEOUS at 12:07

## 2025-02-28 RX ADMIN — SODIUM CHLORIDE 3000 MG: 900 INJECTION INTRAVENOUS at 05:26

## 2025-02-28 RX ADMIN — INSULIN LISPRO 8 UNITS: 100 INJECTION, SOLUTION INTRAVENOUS; SUBCUTANEOUS at 08:34

## 2025-02-28 RX ADMIN — LINAGLIPTIN 5 MG: 5 TABLET, FILM COATED ORAL at 08:29

## 2025-02-28 RX ADMIN — PANTOPRAZOLE SODIUM 40 MG: 40 TABLET, DELAYED RELEASE ORAL at 05:26

## 2025-02-28 RX ADMIN — ASPIRIN 81 MG: 81 TABLET, COATED ORAL at 08:29

## 2025-02-28 RX ADMIN — EMPAGLIFLOZIN 10 MG: 10 TABLET, FILM COATED ORAL at 08:30

## 2025-02-28 RX ADMIN — LISINOPRIL 20 MG: 20 TABLET ORAL at 08:29

## 2025-02-28 RX ADMIN — ESCITALOPRAM OXALATE 10 MG: 10 TABLET ORAL at 08:29

## 2025-02-28 RX ADMIN — GLIPIZIDE 5 MG: 5 TABLET ORAL at 08:33

## 2025-02-28 NOTE — DISCHARGE SUMMARY
Orthopedic Discharge Summary      Patient: Haresh Rg      YOB: 1965    Medical Record Number: [id number]    Attending Physician: Ming Rider DO  Consulting Physician(s):   Date of Admission: 2/27/2025  5:57 AM  Date of Discharge:       Status post reverse total shoulder replacement, left    [unfilled]  L RTSA  Allergies:   Allergies   Allergen Reactions    Codeine Hives and Shortness Of Breath    Morphine Hives, Shortness Of Breath and Dizziness               Statins Swelling     NOT SURE WHICH ONE CAUSED THE SWELLING, BUT NONE WORKED PER PT       Current Medications:     Discharge Medications        Continue These Medications        Instructions Start Date   Dexcom G6 Sensor   APPLY 1 SENSOR EVERY 10 DAYS      Dexcom G6 Transmitter misc   USE FOR 90 DAYS             ASK your doctor about these medications        Instructions Start Date   alfuzosin 10 MG 24 hr tablet  Commonly known as: UROXATRAL   1 tablet, Daily      aspirin 81 MG EC tablet   81 mg, Daily      Baqsimi One Pack 3 MG/DOSE powder  Generic drug: Glucagon   3 mg, As Needed      betamethasone dipropionate 0.05 % cream  Commonly known as: DIPROSONE   1 Application, As Needed      bisoprolol-hydrochlorothiazide 2.5-6.25 MG per tablet  Commonly known as: ZIAC   1 tablet, 2 Times Daily      coenzyme Q10 100 MG capsule   200 mg, Daily      escitalopram 20 MG tablet  Commonly known as: LEXAPRO   10 mg, Daily      glimepiride 2 MG tablet  Commonly known as: AMARYL   2 mg, Every Morning Before Breakfast      Januvia 100 MG tablet  Generic drug: SITagliptin   100 mg, Daily      levothyroxine 75 MCG tablet  Commonly known as: SYNTHROID, LEVOTHROID   75 mcg, Every Early Morning      lisinopril 20 MG tablet  Commonly known as: PRINIVIL,ZESTRIL   20 mg, Daily      NovoLOG FlexPen 100 UNIT/ML solution pen-injector sc pen  Generic drug: insulin aspart   3 Times Daily With Meals      omeprazole 40 MG capsule  Commonly known as: priLOSEC   1  capsule, Daily      rOPINIRole 4 MG tablet  Commonly known as: REQUIP   4 mg, Nightly      Toujeo SoloStar 300 UNIT/ML solution pen-injector injection  Generic drug: Insulin Glargine (1 Unit Dial)   80 Units, Every Evening      Trulicity 3 MG/0.5ML solution auto-injector  Generic drug: Dulaglutide   3 mg, Weekly      Xigduo XR 5-1000 MG tablet  Generic drug: dapagliflozin-metformin HCl ER   1 tablet, 2 Times Daily               Past Medical History:   Diagnosis Date    Anesthesia complication     URINARY RETENTION AFTER KNEE SURGERY, VERY SORE THROAT FOR 2 YEARS AFTER HERNIA SURGERY, PT STATES THEY DIDN'T TELL HIM IF HE WAS DIFFICULT INTUBATION    Anxiety     Arthritis     Diabetes mellitus     Early cataract     Food bolus obstruction of intestine     HX    GERD (gastroesophageal reflux disease)     History of esophageal ulcer 1992    WHEN HAD ELECTICAL SHOCK    History of stroke 1986    WHILE IN THE [employer], HAD BROKEN JAW, WAS UNDER ANESTHESIA AND THEY TOLD HIM HE HAD STROKE,  PT STATES ONLY RESIDUAL IS DYSLEXIA    Hyperlipidemia     Hypertension     Hypothyroidism     Joint pain     Left shoulder pain     Scab     SMALL ON ARMS AND CHEST, HAS SEEN MULTIPLE DERMATOLOGIST, ? RELATED TO AGENT ORANGE, PT USING CREAM PRN, TO MONITOR FOR SIGNS OF INFECTION    Shock 1992    ELECTRICAL, STATES THEY TOLD HIM HE HAD A HEART ATTACK AT THE TIME, BUT NEVER SHOWN UP ON TESTING AND NO ISSUES SINCE    Sleep apnea     CPAP    Vasovagal syncope     HX     Past Surgical History:   Procedure Laterality Date    COLONOSCOPY      ENDOSCOPY      FOOD BOLUS    FRACTURE SURGERY      8 SCREWS IN JAW    REPLACEMENT TOTAL KNEE BILATERAL  2013    SINUS SURGERY      UMBILICAL HERNIA REPAIR  2013     Social History     Occupational History    Not on file   Tobacco Use    Smoking status: Never    Smokeless tobacco: Never   Vaping Use    Vaping status: Never Used   Substance and Sexual Activity    Alcohol use: Yes     Comment: 1 BEER PER MONTH     "Drug use: Never    Sexual activity: Defer      Social History     Social History Narrative    Not on file     Family History   Problem Relation Age of Onset    Malig Hyperthermia Neg Hx          Vitals:    02/27/25 1523 02/27/25 1756 02/27/25 1957 02/27/25 2355   BP: 144/97 144/97 148/93 115/63   BP Location: Right arm Right arm Right arm Right arm   Patient Position: Lying Lying Sitting Lying   Pulse: 106 106 112 107   Resp: 22 22 22 22   Temp: 97.9 °F (36.6 °C) 97.9 °F (36.6 °C) 98.1 °F (36.7 °C) 98.2 °F (36.8 °C)   TempSrc: Oral Oral Oral Oral   SpO2: 94% 94% 92% 94%   Weight:  (!) 154 kg (340 lb)     Height:  180.3 cm (71\")           Physical Exam: 59 y.o. male  General Appearance:    Alert, cooperative, in no acute distress           Extremities: intact       Pulses:  Pulses palpable and equal bilaterally.       Skin:  No bleeding, bruising or rash.       Lymph nodes:  No palpable adenopathy.       Neurologic:  No deficits noted.              Hospital Course:  59 y.o. male admitted to [facility] to services of Ming Rider DOto Scott D. Kuiper, MD for L RTSA.  Antibiotic and VTE prophylaxis were per [facility] protocols. Post-operatively the patient transferred to the post-operative floor where the patient underwent mobilization therapy that included active as well as passive ROM exercises. Opioids were titrated to achieve appropriate pain management to allow for participation in mobilization exercises. Vital signs are now stable. The incision is intact without signs or symptoms of infection and dressing was changed. Appropriate education re: incision care, activity levels, medications, and follow up visits was completed and all questions were answered. The patient is now deemed stable for discharge.    Discharge and Follow up Instructions: Follow up in 10-14 days.    Date: [unfilled]  Francis Wheat MD          "

## 2025-02-28 NOTE — DISCHARGE SUMMARY
Orthopedic Discharge Summary      Patient: Haresh Rg      YOB: 1965    Medical Record Number: [id number]    Attending Physician: Ming Rider DO  Consulting Physician(s):   Date of Admission: 2/27/2025  5:57 AM  Date of Discharge:       Status post reverse total shoulder replacement, left    [unfilled]  L RTSA  Allergies:   Allergies   Allergen Reactions    Codeine Hives and Shortness Of Breath    Morphine Hives, Shortness Of Breath and Dizziness               Statins Swelling     NOT SURE WHICH ONE CAUSED THE SWELLING, BUT NONE WORKED PER PT       Current Medications:     Discharge Medications        Continue These Medications        Instructions Start Date   Dexcom G6 Sensor   APPLY 1 SENSOR EVERY 10 DAYS      Dexcom G6 Transmitter misc   USE FOR 90 DAYS             ASK your doctor about these medications        Instructions Start Date   alfuzosin 10 MG 24 hr tablet  Commonly known as: UROXATRAL   1 tablet, Daily      aspirin 81 MG EC tablet   81 mg, Daily      Baqsimi One Pack 3 MG/DOSE powder  Generic drug: Glucagon   3 mg, As Needed      betamethasone dipropionate 0.05 % cream  Commonly known as: DIPROSONE   1 Application, As Needed      bisoprolol-hydrochlorothiazide 2.5-6.25 MG per tablet  Commonly known as: ZIAC   1 tablet, 2 Times Daily      coenzyme Q10 100 MG capsule   200 mg, Daily      escitalopram 20 MG tablet  Commonly known as: LEXAPRO   10 mg, Daily      glimepiride 2 MG tablet  Commonly known as: AMARYL   2 mg, Every Morning Before Breakfast      Januvia 100 MG tablet  Generic drug: SITagliptin   100 mg, Daily      levothyroxine 75 MCG tablet  Commonly known as: SYNTHROID, LEVOTHROID   75 mcg, Every Early Morning      lisinopril 20 MG tablet  Commonly known as: PRINIVIL,ZESTRIL   20 mg, Daily      NovoLOG FlexPen 100 UNIT/ML solution pen-injector sc pen  Generic drug: insulin aspart   3 Times Daily With Meals      omeprazole 40 MG capsule  Commonly known as: priLOSEC   1  capsule, Daily      rOPINIRole 4 MG tablet  Commonly known as: REQUIP   4 mg, Nightly      Toujeo SoloStar 300 UNIT/ML solution pen-injector injection  Generic drug: Insulin Glargine (1 Unit Dial)   80 Units, Every Evening      Trulicity 3 MG/0.5ML solution auto-injector  Generic drug: Dulaglutide   3 mg, Weekly      Xigduo XR 5-1000 MG tablet  Generic drug: dapagliflozin-metformin HCl ER   1 tablet, 2 Times Daily               Past Medical History:   Diagnosis Date    Anesthesia complication     URINARY RETENTION AFTER KNEE SURGERY, VERY SORE THROAT FOR 2 YEARS AFTER HERNIA SURGERY, PT STATES THEY DIDN'T TELL HIM IF HE WAS DIFFICULT INTUBATION    Anxiety     Arthritis     Diabetes mellitus     Early cataract     Food bolus obstruction of intestine     HX    GERD (gastroesophageal reflux disease)     History of esophageal ulcer 1992    WHEN HAD ELECTICAL SHOCK    History of stroke 1986    WHILE IN THE [employer], HAD BROKEN JAW, WAS UNDER ANESTHESIA AND THEY TOLD HIM HE HAD STROKE,  PT STATES ONLY RESIDUAL IS DYSLEXIA    Hyperlipidemia     Hypertension     Hypothyroidism     Joint pain     Left shoulder pain     Scab     SMALL ON ARMS AND CHEST, HAS SEEN MULTIPLE DERMATOLOGIST, ? RELATED TO AGENT ORANGE, PT USING CREAM PRN, TO MONITOR FOR SIGNS OF INFECTION    Shock 1992    ELECTRICAL, STATES THEY TOLD HIM HE HAD A HEART ATTACK AT THE TIME, BUT NEVER SHOWN UP ON TESTING AND NO ISSUES SINCE    Sleep apnea     CPAP    Vasovagal syncope     HX     Past Surgical History:   Procedure Laterality Date    COLONOSCOPY      ENDOSCOPY      FOOD BOLUS    FRACTURE SURGERY      8 SCREWS IN JAW    REPLACEMENT TOTAL KNEE BILATERAL  2013    SINUS SURGERY      UMBILICAL HERNIA REPAIR  2013     Social History     Occupational History    Not on file   Tobacco Use    Smoking status: Never    Smokeless tobacco: Never   Vaping Use    Vaping status: Never Used   Substance and Sexual Activity    Alcohol use: Yes     Comment: 1 BEER PER MONTH     "Drug use: Never    Sexual activity: Defer      Social History     Social History Narrative    Not on file     Family History   Problem Relation Age of Onset    Malig Hyperthermia Neg Hx          Vitals:    02/27/25 1523 02/27/25 1756 02/27/25 1957 02/27/25 2355   BP: 144/97 144/97 148/93 115/63   BP Location: Right arm Right arm Right arm Right arm   Patient Position: Lying Lying Sitting Lying   Pulse: 106 106 112 107   Resp: 22 22 22 22   Temp: 97.9 °F (36.6 °C) 97.9 °F (36.6 °C) 98.1 °F (36.7 °C) 98.2 °F (36.8 °C)   TempSrc: Oral Oral Oral Oral   SpO2: 94% 94% 92% 94%   Weight:  (!) 154 kg (340 lb)     Height:  180.3 cm (71\")           Physical Exam: 59 y.o. male  General Appearance:    Alert, cooperative, in no acute distress           Extremities: intact       Pulses:  Pulses palpable and equal bilaterally.       Skin:  No bleeding, bruising or rash.       Lymph nodes:  No palpable adenopathy.       Neurologic:  No deficits noted.              Hospital Course:  59 y.o. male admitted to [facility] to services of Ming Rider DOto Scott D. Kuiper, MD for L RTSA.  Antibiotic and VTE prophylaxis were per SCIP protocols. Post-operatively the patient transferred to the post-operative floor where the patient underwent mobilization therapy that included active as well as passive ROM exercises. Opioids were titrated to achieve appropriate pain management to allow for participation in mobilization exercises. Vital signs are now stable. The incision is intact without signs or symptoms of infection and dressing was changed. Appropriate education re: incision care, activity levels, medications, and follow up visits was completed and all questions were answered. The patient is now deemed stable for discharge.    Discharge and Follow up Instructions: Follow up in 10-14 days.    Date: [unfilled]  Francis Wheat MD          "

## 2025-02-28 NOTE — PLAN OF CARE
Problem: Adult Inpatient Plan of Care  Goal: Plan of Care Review  Outcome: Progressing  Flowsheets (Taken 2/28/2025 2335)  Progress: improving  Outcome Evaluation: Pt A&Ox4, RA whole shift, no respiratory distress overnight, ad alexandra in the room, IV abx, ACHS, no other complaints, plan of care continue.  Plan of Care Reviewed With: patient   Goal Outcome Evaluation:  Plan of Care Reviewed With: patient        Progress: improving  Outcome Evaluation: Pt A&Ox4, RA whole shift, no respiratory distress overnight, ad gladis in the room, IV abx, ACHS, no other complaints, plan of care continue.

## 2025-02-28 NOTE — CONSULTS
CONSULT NOTE    INTERNAL MEDICINE   [facility]       Patient Identification:  Name: Haresh Rg  Age: 59 y.o.  Sex: male  :  1965  MRN: [id number]             Date of Consultation:  25          Primary Care Physician: Lionel Curiel MD                               Requesting Physician: dr zambrano  Reason for Consultation:medical management    Chief Complaint:  59 year old gentleman was admitted following a shoulder replacement; we are asked to see him regarding medical management; he has a history of diabetes, hypertension, hyperlipidemia, hypothyroidism and sleep apnea; he feels well postop; family is in the room with him    History of Present Illness:   As above      Past Medical History:  Past Medical History:   Diagnosis Date    Anesthesia complication     URINARY RETENTION AFTER KNEE SURGERY, VERY SORE THROAT FOR 2 YEARS AFTER HERNIA SURGERY, PT STATES THEY DIDN'T TELL HIM IF HE WAS DIFFICULT INTUBATION    Anxiety     Arthritis     Diabetes mellitus     Early cataract     Food bolus obstruction of intestine     HX    GERD (gastroesophageal reflux disease)     History of esophageal ulcer     WHEN HAD ELECTICAL SHOCK    History of stroke     WHILE IN THE [employer], HAD BROKEN JAW, WAS UNDER ANESTHESIA AND THEY TOLD HIM HE HAD STROKE,  PT STATES ONLY RESIDUAL IS DYSLEXIA    Hyperlipidemia     Hypertension     Hypothyroidism     Joint pain     Left shoulder pain     Scab     SMALL ON ARMS AND CHEST, HAS SEEN MULTIPLE DERMATOLOGIST, ? RELATED TO AGENT ORANGE, PT USING CREAM PRN, TO MONITOR FOR SIGNS OF INFECTION    Shock     ELECTRICAL, STATES THEY TOLD HIM HE HAD A HEART ATTACK AT THE TIME, BUT NEVER SHOWN UP ON TESTING AND NO ISSUES SINCE    Sleep apnea     CPAP    Vasovagal syncope     HX     Past Surgical History:  Past Surgical History:   Procedure Laterality Date    COLONOSCOPY      ENDOSCOPY      FOOD BOLUS    FRACTURE SURGERY      8 SCREWS IN JAW    REPLACEMENT  TOTAL KNEE BILATERAL  2013    SINUS SURGERY      UMBILICAL HERNIA REPAIR  2013      Home Meds:  Medications Prior to Admission   Medication Sig Dispense Refill Last Dose/Taking    alfuzosin (UROXATRAL) 10 MG 24 hr tablet Take 1 tablet by mouth Daily.   2/26/2025    betamethasone dipropionate (DIPROSONE) 0.05 % cream Apply 1 Application topically to the appropriate area as directed As Needed.   2/27/2025 Morning    bisoprolol-hydrochlorothiazide (ZIAC) 2.5-6.25 MG per tablet Take 1 tablet by mouth 2 (Two) Times a Day.   2/27/2025 Morning    escitalopram (LEXAPRO) 20 MG tablet Take 0.5 tablets by mouth Daily.   2/27/2025 Morning    glimepiride (AMARYL) 2 MG tablet Take 1 tablet by mouth Every Morning Before Breakfast.   2/26/2025    Januvia 100 MG tablet Take 1 tablet by mouth Daily.   2/26/2025    levothyroxine (SYNTHROID, LEVOTHROID) 75 MCG tablet Take 1 tablet by mouth Every Morning.   2/27/2025 Morning    lisinopril (PRINIVIL,ZESTRIL) 20 MG tablet Take 1 tablet by mouth Daily. HOLD AM OF SURGERY   2/26/2025    NovoLOG FlexPen 100 UNIT/ML solution pen-injector sc pen Inject  under the skin into the appropriate area as directed 3 (Three) Times a Day With Meals. 40 UNITS WITH BREAKFAST, 40 UNITS WITH LUNCH AND 70 UNITS WITH DINNER   2/26/2025    omeprazole (priLOSEC) 40 MG capsule Take 1 capsule by mouth Daily.   2/27/2025 Morning    rOPINIRole (REQUIP) 4 MG tablet Take 1 tablet by mouth Every Night.   2/26/2025    Toujeo SoloStar 300 UNIT/ML solution pen-injector injection Inject 80 Units under the skin into the appropriate area as directed Every Evening.   2/26/2025    Xigduo XR 5-1000 MG tablet Take 1 tablet by mouth 2 (Two) Times a Day.   2/26/2025    aspirin 81 MG EC tablet Take 1 tablet by mouth Daily. PT HOLDING FOR SURGERY   2/16/2025    coenzyme Q10 100 MG capsule Take 2 capsules by mouth Daily. PT TO HOLD FOR SURGERY   2/16/2025    Continuous Glucose Sensor (Dexcom G6 Sensor) APPLY 1 SENSOR EVERY 10 DAYS        Continuous Glucose Transmitter (Dexcom G6 Transmitter) misc USE FOR 90 DAYS       Glucagon (Baqsimi One Pack) 3 MG/DOSE powder Take 3 mg by mouth As Needed. WHEN BLOOD SUGAR LESS THAN 50       Trulicity 3 MG/0.5ML solution auto-injector Inject 3 mg under the skin into the appropriate area as directed 1 (One) Time Per Week. SUNDAYS, PT TO HOLD 1 WEEK PRIOR TO SURGERY  JUST GOT REFILLED, LAST DOSE WILL BE 2/18/25 2/22/2025     Current Meds:     Current Facility-Administered Medications:     aspirin EC tablet 81 mg, 81 mg, Oral, Daily, Francis Wheat MD    [START ON 2/28/2025] bisoprolol (ZEBeta) tablet 2.5 mg, 2.5 mg, Oral, Daily **AND** hydroCHLOROthiazide tablet 6.25 mg, 6.25 mg, Oral, Daily, Francis Wheat MD    ceFAZolin 3000 mg IVPB in 100 mL NS (MBP), 3,000 mg, Intravenous, Q8H, Francis Wheat MD    dextrose (D50W) (25 g/50 mL) IV injection 25 g, 25 g, Intravenous, Q15 Min PRN, Pham Contreras MD    dextrose (GLUTOSE) oral gel 15 g, 15 g, Oral, Q15 Min PRN, Pham Contreras MD    diazePAM (VALIUM) tablet 5 mg, 5 mg, Oral, Q6H PRN, Francis Wheat MD    empagliflozin (JARDIANCE) tablet 10 mg, 10 mg, Oral, Daily **AND** [START ON 2/28/2025] metFORMIN ER (GLUCOPHAGE-XR) 24 hr tablet 1,000 mg, 1,000 mg, Oral, Daily With Breakfast, Francis Wheat MD    [START ON 2/28/2025] escitalopram (LEXAPRO) tablet 10 mg, 10 mg, Oral, Daily, Francis Wheat MD    [START ON 2/28/2025] glipizide (GLUCOTROL) tablet 5 mg, 5 mg, Oral, QAM AC, Francis Wheat MD    glucagon (GLUCAGEN) injection 1 mg, 1 mg, Intramuscular, Q15 Min PRN, Pham Contreras MD    HYDROmorphone (DILAUDID) injection 0.5 mg, 0.5 mg, Intravenous, Q2H PRN **AND** naloxone (NARCAN) injection 0.1 mg, 0.1 mg, Intravenous, Q5 Min PRN, Francis Wheat MD    insulin lispro (HUMALOG/ADMELOG) injection 2-7 Units, 2-7 Units, Subcutaneous, 4x Daily AC & at Bedtime, Pham Contreras MD    lactated ringers infusion, 9 mL/hr,  Intravenous, Continuous, Ba Barba MD, Last Rate: 9 mL/hr at 02/27/25 0854, New Bag at 02/27/25 0944    [START ON 2/28/2025] levothyroxine (SYNTHROID, LEVOTHROID) tablet 75 mcg, 75 mcg, Oral, Q AM, Francis Wheat MD    linagliptin (TRADJENTA) tablet 5 mg, 5 mg, Oral, Daily, Francis Wheat MD    lisinopril (PRINIVIL,ZESTRIL) tablet 20 mg, 20 mg, Oral, Daily, Francis Wheat MD    ondansetron ODT (ZOFRAN-ODT) disintegrating tablet 4 mg, 4 mg, Oral, Q6H PRN **OR** ondansetron (ZOFRAN) injection 4 mg, 4 mg, Intravenous, Q6H PRN, Francis Wheat MD    oxyCODONE-acetaminophen (PERCOCET) 5-325 MG per tablet 2 tablet, 2 tablet, Oral, Q4H PRN, Francis Wheat MD    oxyCODONE-acetaminophen (PERCOCET) 7.5-325 MG per tablet 1 tablet, 1 tablet, Oral, Q4H PRN, Francis Wheat MD    [START ON 2/28/2025] pantoprazole (PROTONIX) EC tablet 40 mg, 40 mg, Oral, Q AM, Francis Wheat MD    rOPINIRole (REQUIP) tablet 4 mg, 4 mg, Oral, Nightly, Francis Wheat MD    sodium chloride 0.45 % infusion, 75 mL/hr, Intravenous, Continuous, Francis Wheat MD    tamsulosin (FLOMAX) 24 hr capsule 0.4 mg, 0.4 mg, Oral, Nightly, Francis Wheat MD  Allergies:  Allergies   Allergen Reactions    Codeine Hives and Shortness Of Breath    Morphine Hives, Shortness Of Breath and Dizziness               Statins Swelling     NOT SURE WHICH ONE CAUSED THE SWELLING, BUT NONE WORKED PER PT     Social History:   Social History     Socioeconomic History    Marital status:    Tobacco Use    Smoking status: Never    Smokeless tobacco: Never   Vaping Use    Vaping status: Never Used   Substance and Sexual Activity    Alcohol use: Yes     Comment: 1 BEER PER MONTH    Drug use: Never    Sexual activity: Defer     Family History:  Family History   Problem Relation Age of Onset    Malig Hyperthermia Neg Hx           Review of Systems  See history of present illness and past medical history.  Patient denies headache, dizziness, syncope, falls,  "trauma, change in vision, change in hearing, change in taste, changes in weight, changes in appetite, focal weakness, numbness, or paresthesia.  Patient denies chest pain, palpitations, dyspnea, orthopnea, PND, cough, sinus pressure, rhinorrhea, epistaxis, hemoptysis, nausea, vomiting,hematemesis, diarrhea, constipation or hematochezia. Denies cold or heat intolerance, polydipsia, polyuria, polyphagia. Denies hematuria, pyuria, dysuria, hesitancy, frequency or urgency. Denies consumption of raw and under cooked meats foods or change in water source.         Vitals:   /97 (BP Location: Right arm, Patient Position: Lying)   Pulse 106   Temp 97.9 °F (36.6 °C) (Oral)   Resp 22   Ht 180.3 cm (71\")   Wt (!) 154 kg (340 lb)   SpO2 94%   BMI 47.42 kg/m²   I/O:   Intake/Output Summary (Last 24 hours) at 2/27/2025 1947  Last data filed at 2/27/2025 1355  Gross per 24 hour   Intake 2150 ml   Output --   Net 2150 ml     Exam:  General Appearance:    Alert, cooperative, no distress, appears stated age   Head:    Normocephalic, without obvious abnormality, atraumatic   Eyes:    PERRL, conjunctivae/corneas clear, EOM's intact, both eyes   Ears:    Normal external ear canals, both ears   Nose:   Nares normal, septum midline, mucosa normal, no drainage    or sinus tenderness   Throat:   Lips, tongue, gums normal; oral mucosa pink and moist   Neck:   Supple, symmetrical, trachea midline, no adenopathy;     thyroid:  no enlargement/tenderness/nodules; no carotid    bruit or JVD   Back:     Symmetric, no curvature, ROM normal, no CVA tenderness   Lungs:     Decreased breath sounds bilaterally, respirations unlabored   Chest Wall:    No tenderness or deformity    Heart:    Regular rate and rhythm, S1 and S2 normal, no murmur, rub   or gallop   Abdomen:     Soft, nontender, bowel sounds active all four quadrants,     no masses, no hepatomegaly, no splenomegaly   Extremities:   Extremities normal, atraumatic, dressing, sling " "in place                Data Review:  Labs in chart were reviewed.  No results found for: \"WBC\", \"HGB\", \"HCT\", \"PLT\"  No results found for: \"NA\", \"K\", \"CL\", \"CO2\", \"BUN\", \"CREATININE\", \"GLUCOSE\"  No results found for: \"CALCIUM\", \"MG\", \"PHOS\"  No results found for: \"AST\", \"ALT\", \"ALKPHOS\"            Imaging Results (Last 7 Days)       Procedure Component Value Units Date/Time    XR Chest 1 View [[id number]] Collected: 02/27/25 1201     Updated: 02/27/25 1209    Narrative:      XR CHEST 1 VW-     HISTORY: Male who is 59 years-old, respiratory failure     TECHNIQUE: Frontal view of the chest     COMPARISON: 2/18/2025     FINDINGS: Endotracheal tube tip appears to be about 6.5 cm above the  ambar, suggest advancing the tube. The heart size appears borderline.  Pulmonary vasculature appears congested. Mild likely atelectasis or  infiltrate in the lower lungs, follow-up as indications persist. No  large pleural effusion, or pneumothorax. No acute osseous process.       Impression:      As described.     Discussed by telephone with patient's nurse, Linette, at time of  interpretation, 1203, 2/27/2025.     This report was finalized on 2/27/2025 12:06 PM by Dr. Jason Ellsworth M.D on Workstation: [id number]       XR Shoulder 1 View Left [[id number]] Collected: 02/27/25 1159     Updated: 02/27/25 1204    Narrative:      INDICATIONS: Postoperative evaluation.     TECHNIQUE: Frontal view of the left shoulder     COMPARISON: None available     FINDINGS:      Intact appearing left shoulder arthroplasty hardware is seen with  adjacent surgical soft tissue gas. No acute fracture is identified.          Impression:         Postsurgical changes.           This report was finalized on 2/27/2025 12:01 PM by Dr. Jason Ellsworth M.D on Workstation: [id number]       IMAGING SCANNED [[id number]] Resulted: 02/27/25     Updated: 02/26/25 0837    IMAGING SCANNED [[id number]] Resulted: 02/27/25     Updated: 02/26/25 0837          Past Medical " History:   Diagnosis Date    Anesthesia complication     URINARY RETENTION AFTER KNEE SURGERY, VERY SORE THROAT FOR 2 YEARS AFTER HERNIA SURGERY, PT STATES THEY DIDN'T TELL HIM IF HE WAS DIFFICULT INTUBATION    Anxiety     Arthritis     Diabetes mellitus     Early cataract     Food bolus obstruction of intestine     HX    GERD (gastroesophageal reflux disease)     History of esophageal ulcer 1992    WHEN HAD ELECTICAL SHOCK    History of stroke 1986    WHILE IN THE [employer], HAD BROKEN JAW, WAS UNDER ANESTHESIA AND THEY TOLD HIM HE HAD STROKE,  PT STATES ONLY RESIDUAL IS DYSLEXIA    Hyperlipidemia     Hypertension     Hypothyroidism     Joint pain     Left shoulder pain     Scab     SMALL ON ARMS AND CHEST, HAS SEEN MULTIPLE DERMATOLOGIST, ? RELATED TO AGENT ORANGE, PT USING CREAM PRN, TO MONITOR FOR SIGNS OF INFECTION    Shock 1992    ELECTRICAL, STATES THEY TOLD HIM HE HAD A HEART ATTACK AT THE TIME, BUT NEVER SHOWN UP ON TESTING AND NO ISSUES SINCE    Sleep apnea     CPAP    Vasovagal syncope     HX       Assessment:  Active Hospital Problems    Diagnosis  POA    **Status post reverse total shoulder replacement, left [Z96.612]  Not Applicable      Resolved Hospital Problems   No resolved problems to display.   Diabetes  Hypertension  Obesity  Hyperlipidemia  Hypothyroidism  Sleep apnea    Plan:  Will add insulin sliding scale and accu checks  Monitor bp  Cpap for sleep apnea  Trend labs  Will follow with you  Thanks for involving us in his care    Pham Contreras MD   2/27/2025  19:47 EST

## 2025-02-28 NOTE — PLAN OF CARE
Goal Outcome Evaluation:  Plan of Care Reviewed With: patient        Progress: no change  Outcome Evaluation: PATIENT is a 60 y/o male who presents to [facility] on 2/27/25 s/p left reverse TSA. Pt presents to OT [facility] with LUE in sling, NWB on LUE, A&O x4 on RA. Pt Ed and provided written documentation for Sling mgmt to be worn at all times, (except during ex and showering with waterproof bandage), mayco dressing tech and AROM post op exercises for TSA protocol to facilitate improved UE mobility prior to outpatient OT eval. Pt plans to discharge home and follow up w/ MD and OP therapy in 2wks.    Anticipated Discharge Disposition (OT): home, home with outpatient therapy services

## 2025-02-28 NOTE — PROGRESS NOTES
Orthopedic Progress Note    Subjective     Post-Operative Day: 1 post-left RTSA  Systemic or Specific Complaints: No Complaints. Pain is under control with PO meds.     Objective     Vital signs in last 24 hours:  Temp:  [97.9 °F (36.6 °C)-98.2 °F (36.8 °C)] 98.2 °F (36.8 °C)  Heart Rate:  [] 107  Resp:  [20-36] 22  BP: ()/() 115/63    General: alert, appears stated age and cooperative   Neurovascular: intact  Radial pulse: 2+.   Wound: Dressing clean and dry.    Range of Motion: Limited ROM Post op     Data Review  CBC:      Assessment & Plan     IMPRESSION:stable status post LEFT REVERSE total shoulder arthroplasty    PLAN: D/C home today. Change dressing to a light watertight bandage. Pendulum swings and elbow and wrist range of motion initiated.  Follow up in office 10-14 days (already scheduled).  Physical therapy is scheduled.    Activity: As directed by physical therapy.  Sling for protection.  Pendulum swings, elbow and wrist range of motion.  Ice for swelling     LOS: 0 days     Francis Wheat MD    Date: 2/28/2025  Time: 07:40 EST

## 2025-02-28 NOTE — THERAPY EVALUATION
Patient Name: Haresh Rg  : 1965    MRN: [id number]                              Today's Date: 2025       Admit Date: 2025    Visit Dx:     ICD-10-CM ICD-9-CM   1. Status post reverse total shoulder replacement, left  Z96.612 V43.61     Patient Active Problem List   Diagnosis    Status post reverse total shoulder replacement, left     Past Medical History:   Diagnosis Date    Anesthesia complication     URINARY RETENTION AFTER KNEE SURGERY, VERY SORE THROAT FOR 2 YEARS AFTER HERNIA SURGERY, PT STATES THEY DIDN'T TELL HIM IF HE WAS DIFFICULT INTUBATION    Anxiety     Arthritis     Diabetes mellitus     Early cataract     Food bolus obstruction of intestine     HX    GERD (gastroesophageal reflux disease)     History of esophageal ulcer     WHEN HAD ELECTICAL SHOCK    History of stroke     WHILE IN [employer], HAD BROKEN JAW, WAS UNDER ANESTHESIA AND THEY TOLD HIM HE HAD STROKE,  PT STATES ONLY RESIDUAL IS DYSLEXIA    Hyperlipidemia     Hypertension     Hypothyroidism     Joint pain     Left shoulder pain     Scab     SMALL ON ARMS AND CHEST, HAS SEEN MULTIPLE DERMATOLOGIST, ? RELATED TO AGENT ORANGE, PT USING CREAM PRN, TO MONITOR FOR SIGNS OF INFECTION    Shock     ELECTRICAL, STATES THEY TOLD HIM HE HAD A HEART ATTACK AT THE TIME, BUT NEVER SHOWN UP ON TESTING AND NO ISSUES SINCE    Sleep apnea     CPAP    Vasovagal syncope     HX     Past Surgical History:   Procedure Laterality Date    COLONOSCOPY      ENDOSCOPY      FOOD BOLUS    FRACTURE SURGERY      8 SCREWS IN JAW    REPLACEMENT TOTAL KNEE BILATERAL      SINUS SURGERY      UMBILICAL HERNIA REPAIR        General Information       Row Name 25 0845          OT Time and Intention    Document Type evaluation;discharge evaluation/summary  -PP     Mode of Treatment individual therapy;occupational therapy  -PP       Row Name 25 0845          General Information    Patient Profile Reviewed yes  -PP     Prior Level  of Function independent:  -PP     Existing Precautions/Restrictions non-weight bearing;left  NWB on LUE in sling  -PP     Barriers to Rehab none identified  -PP       Row Name 02/28/25 0845          Living Environment    People in Home spouse  -PP       Row Name 02/28/25 0845          Cognition    Orientation Status (Cognition) oriented x 4  -PP       Row Name 02/28/25 0845          Safety Issues/Impairments Affecting Functional Mobility    Impairments Affecting Function (Mobility) range of motion (ROM);sensation/sensory awareness  -PP     Comment, Safety Issues/Impairments (Mobility) gait belt and non skid socks worn for safety  -PP               User Key  (r) = Recorded By, (t) = Taken By, (c) = Cosigned By      Initials Name Provider Type    PP Fatuma Rosas OT Occupational Therapist                     Mobility/ADL's       Row Name 02/28/25 0846          Bed Mobility    Bed Mobility bed mobility (all) activities  -PP     All Activities, [place] (Bed Mobility) independent  -PP     Comment, (Bed Mobility) Pt [facility], and (I) for bed mob per chart  -PP       Row Name 02/28/25 0846          Transfers    Comment, (Transfers) Pt up ad gladis in room per chart  -PP       Row Name 02/28/25 0846          Functional Mobility    Functional Mobility- Ind. Level independent  -PP     Functional Mobility- Comment ad gladis in room  -PP       Row Name 02/28/25 0846          Activities of Daily Living    BADL Assessment/Intervention upper body dressing  Pt likely Mod I for all ADL tasks using RUE  -PP       Row Name 02/28/25 0846          Mobility    Extremity Weight-bearing Status left upper extremity  -PP     Left Upper Extremity (Weight-bearing Status) non weight-bearing (NWB)  -PP       Row Name 02/28/25 0846          Upper Body Dressing Assessment/Training    Comment, (Upper Body Dressing) Ed in don/doff Sling and mayco dressing tech, pt demo's good understanding and carryover after Ed  -PP               User Key  (r) =  Recorded By, (t) = Taken By, (c) = Cosigned By      Initials Name Provider Type    PP Fatuma Rosas OT Occupational Therapist                   Obj/Interventions       Row Name 02/28/25 0850          Sensory Assessment (Somatosensory)    Sensory Assessment (Somatosensory) sensation intact;other (see comments)  -PP     Sensory Assessment Pt block has not wore off on LUE, otherwise sensation intact  -PP       Row Name 02/28/25 0850          Vision Assessment/Intervention    Visual Impairment/Limitations WFL  -PP       Row Name 02/28/25 0850          Range of Motion Comprehensive    General Range of Motion other (see comments)  -PP     Comment, General Range of Motion Pt RUE ROM WFL, Pt LUE not assessed d/t s/p reverse total shoulder arthoplasty  -PP       Row Name 02/28/25 0850          Strength Comprehensive (MMT)    General Manual Muscle Testing (MMT) Assessment other (see comments)  -PP     Comment, General Manual Muscle Testing (MMT) Assessment RUE MMT WFL grossly 4/5, LUE not assessed  -PP       Row Name 02/28/25 0850          Balance    Balance Assessment sitting static balance  -PP     Static Sitting Balance independent  -PP     Comment, Balance Pt Ed and trained in post op total shoulder gentle AROM exercise program, including pendulum exercises, and elbow flex/ext for 10 reps x 6 times daily. Pt instructed to start once block wears off and he demo's good understanding and carryover at this time.  -PP               User Key  (r) = Recorded By, (t) = Taken By, (c) = Cosigned By      Initials Name Provider Type    PP Fatuma Rosas, OT Occupational Therapist                   Goals/Plan       Row Name 02/28/25 0828          Problem Specific Goal 1 (OT)    Problem Specific Goal 1 (OT) Pt and family will demo safe technqiues with ADLs, transfers, total shoulder exercise protocol, and sling mgmt prior to d/c home.  -PP     Time Frame (Problem Specific Goal 1, OT) short term goal (STG);1 day  -PP      Progress/Outcome (Problem Specific Goal 1, OT) goal met  -PP               User Key  (r) = Recorded By, (t) = Taken By, (c) = Cosigned By      Initials Name Provider Type    PP Fatuma Rosas OT Occupational Therapist                   Clinical Impression       Row Name 02/28/25 0851          Pain Assessment    Pre/Posttreatment Pain Comment Nerve block has not wore off  -PP       Row Name 02/28/25 0851          Plan of Care Review    Plan of Care Reviewed With patient  -PP     Progress no change  -PP     Outcome Evaluation PATIENT is a 60 y/o male who presents to [facility] on 2/27/25 s/p left reverse TSA. Pt presents to OT [facility] with LUE in sling, NWB on LUE, A&O x4 on RA. Pt Ed and provided written documentation for Sling mgmt to be worn at all times, (except during ex and showering with waterproof bandage), mayco dressing tech and AROM post op exercises for TSA protocol to facilitate improved UE mobility prior to outpatient OT eval. Pt plans to discharge home and follow up w/ MD and OP therapy in 2wks.  -PP       Row Name 02/28/25 0851          Therapy Assessment/Plan (OT)    Criteria for Skilled Therapeutic Interventions Met (OT) no;no problems identified which require skilled intervention  -PP       Row Name 02/28/25 0851          Therapy Plan Review/Discharge Plan (OT)    Anticipated Discharge Disposition (OT) home;home with outpatient therapy services  -PP       Row Name 02/28/25 0851          Vital Signs    O2 Delivery Pre Treatment room air  -PP       [facility] Name 02/28/25 0851          Positioning and Restraints    Pre-Treatment Position sitting in chair/recliner  -PP     Post Treatment Position chair  -PP     In Chair notified nsg;sitting;call light within reach;encouraged to call for assist;with nsg  -PP               User Key  (r) = Recorded By, (t) = Taken By, (c) = Cosigned By      Initials Name Provider Type    PP Fatuma Rosas, OT Occupational Therapist                   Outcome Measures       Row Name  02/28/25 0854          How much help from another is currently needed...    Putting on and taking off regular lower body clothing? 3  -PP     Bathing (including washing, rinsing, and drying) 3  -PP     Toileting (which includes using toilet bed pan or urinal) 4  -PP     Putting on and taking off regular upper body clothing 4  -PP     Taking care of personal grooming (such as brushing teeth) 4  -PP     Eating meals 4  -PP     AM-PAC 6 Clicks Score (OT) 22  -PP       Row Name 02/27/25 2200          How much help from another person do you currently need...    Turning from your back to your side while in flat bed without using bedrails? 4  -ST     Moving from lying on back to sitting on the side of a flat bed without bedrails? 4  -ST     Moving to and from a bed to a chair (including a wheelchair)? 4  -ST     Standing up from a chair using your arms (e.g., wheelchair, bedside chair)? 4  -ST     Climbing 3-5 steps with a railing? 4  -ST     To walk in hospital room? 4  -ST     AM-PAC 6 Clicks Score (PT) 24  -ST       Row Name 02/28/25 0854          Functional Assessment    Outcome Measure Options AM-PAC 6 Clicks Daily Activity (OT)  -PP               User Key  (r) = Recorded By, (t) = Taken By, (c) = Cosigned By      Initials Name Provider Type    Fatuma Singer OT Occupational Therapist    Jack Wallace, RN Registered Nurse                    Occupational Therapy Education       Title: PT OT SLP Therapies (In Progress)       Topic: Occupational Therapy (In Progress)       Point: ADL training (Done)       Description:   Instruct learner(s) on proper safety adaptation and remediation techniques during self care or transfers.   Instruct in proper use of assistive devices.                  Learning Progress Summary            Patient Acceptance, E,D,H, VU by PP at 2/28/2025 0855    Comment: Pt ED on post op Left reverse TSA protocol, HEP, sling management, NWB precautions, and mayco dressing tech.                       Point: Home exercise program (Done)       Description:   Instruct learner(s) on appropriate technique for monitoring, assisting and/or progressing therapeutic exercises/activities.                  Learning Progress Summary            Patient Acceptance, E,D,H, VU by PP at 2/28/2025 0855    Comment: Pt ED on post op Left reverse TSA protocol, HEP, sling management, NWB precautions, and mayco dressing tech.                      Point: Precautions (Done)       Description:   Instruct learner(s) on prescribed precautions during self-care and functional transfers.                  Learning Progress Summary            Patient Acceptance, E,D,H, VU by PP at 2/28/2025 0855    Comment: Pt ED on post op Left reverse TSA protocol, HEP, sling management, NWB precautions, and mayco dressing tech.                      Point: Body mechanics (Not Started)       Description:   Instruct learner(s) on proper positioning and spine alignment during self-care, functional mobility activities and/or exercises.                  Learner Progress:  Not documented in this visit.                              User Key       Initials Effective Dates Name Provider Type Discipline    PP 06/09/23 -  Fatuma Rosas OT Occupational Therapist OT                  OT Recommendation and Plan     Plan of Care Review  Plan of Care Reviewed With: patient  Progress: no change  Outcome Evaluation: PATIENT is a 60 y/o male who presents to [facility] on 2/27/25 s/p left reverse TSA. Pt presents to OT [facility] with LUE in sling, NWB on LUE, A&O x4 on RA. Pt Ed and provided written documentation for Sling mgmt to be worn at all times, (except during ex and showering with waterproof bandage), mayco dressing tech and AROM post op exercises for TSA protocol to facilitate improved UE mobility prior to outpatient OT eval. Pt plans to discharge home and follow up w/ MD and OP therapy in 2wks.     Time Calculation:   Evaluation Complexity (OT)  Review Occupational  Profile/Medical/Therapy History Complexity: brief/low complexity  Assessment, Occupational Performance/Identification of Deficit Complexity: 1-3 performance deficits  Clinical Decision Making Complexity (OT): problem focused assessment/low complexity  Overall Complexity of Evaluation (OT): low complexity     Time Calculation- OT       Row Name 02/28/25 0844             Time Calculation- OT    OT Start Time 0821  -PP      OT Stop Time 0832  -PP      OT Time Calculation (min) 11 min  -PP      OT Received On 02/28/25  -PP         Untimed Charges    OT Eval/Re-eval Minutes 11  -PP         Total Minutes    Untimed Charges Total Minutes 11  -PP       Total Minutes 11  -PP                User Key  (r) = Recorded By, (t) = Taken By, (c) = Cosigned By      Initials Name Provider Type    PP Fatuma Rosas, OT Occupational Therapist                  Therapy Charges for Today       Code Description Service Date Service Provider Modifiers Qty    [id number]  OT EVAL LOW COMPLEXITY 2 2/28/2025 Fatuma Rosas, OT GO 1                 Fatuma Rosas, OT  2/28/2025

## 2025-02-28 NOTE — SIGNIFICANT NOTE
Patient discharged before hospitalist could assess, he appears hemodynamically stable and without distress.  Labs stable, no contraindication to discharge from hospitalist perspective    Electronically signed by Ming Rider DO, 02/28/25, 1:35 PM EST.

## 2025-02-28 NOTE — CASE MANAGEMENT/SOCIAL WORK
Case Management Discharge Note      Final Note: Home         Selected Continued Care - Discharged on 2/28/2025 Admission date: 2/27/2025 - Discharge disposition: Home or Self Care      Destination    No services have been selected for the patient.                Durable Medical Equipment    No services have been selected for the patient.                Dialysis/Infusion    No services have been selected for the patient.                Home Medical Care    No services have been selected for the patient.                Therapy    No services have been selected for the patient.                Community Resources    No services have been selected for the patient.                Community & DME    No services have been selected for the patient.                         Final Discharge Disposition Code: 01 - home or self-care

## 2025-03-06 ENCOUNTER — TELEPHONE (OUTPATIENT)
Dept: ORTHOPEDIC SURGERY | Facility: HOSPITAL | Age: 60
End: 2025-03-06
Payer: COMMERCIAL

## 2025-03-06 NOTE — TELEPHONE ENCOUNTER
Called and spoke with Mr. Rg to see how he is doing as he is 1 week SP LTA. He said he is doing well. He was in so much pain before surgery, this is nothing. He has taken maybe 3 pills since surgery. He is doing his exercises, icing, and remains in the sling. BM's are good. Things are progressing well. Mr. Rg had a question regarding reintubation. Told him some reasons this could happen but didn't see anything in the notes regarding this issue. Mr. Rg doesn't have any other questions for me at this time. He was given my contact information should he need anything.

## (undated) DEVICE — DRESSING,GAUZE,XEROFORM,CURAD,1"X8",ST: Brand: CURAD

## (undated) DEVICE — YANKAUER,POOLE TIP,STERILE,50/CS: Brand: MEDLINE

## (undated) DEVICE — DRAPE,U/ SHT,SPLIT,PLAS,STERIL: Brand: MEDLINE

## (undated) DEVICE — ZIP 16 SURGICAL SKIN CLOSURE DEVICE, PSA: Brand: ZIP 16 SURGICAL SKIN CLOSURE DEVICE

## (undated) DEVICE — SKIN PREP TRAY W/CHG: Brand: MEDLINE INDUSTRIES, INC.

## (undated) DEVICE — BLANKT WARM LOWR/BDY 100X120CM

## (undated) DEVICE — SHEET, DRAPE, SPLIT, STERILE: Brand: MEDLINE

## (undated) DEVICE — PATIENT RETURN ELECTRODE, SINGLE-USE, CONTACT QUALITY MONITORING, ADULT, WITH 9FT CORD, FOR PATIENTS WEIGING OVER 33LBS. (15KG): Brand: MEGADYNE

## (undated) DEVICE — DUAL CUT SAGITTAL BLADE

## (undated) DEVICE — BIT DRL EQUINOXE 2 AND 3.2MM

## (undated) DEVICE — PK SHLDR OPN 40

## (undated) DEVICE — GLV SURG BIOGEL LTX PF 8 1/2

## (undated) DEVICE — HANDPIECE SET WITH COAXIAL HIGH FLOW TIP AND SUCTION TUBE: Brand: INTERPULSE

## (undated) DEVICE — GLV SURG BIOGEL LTX PF 8

## (undated) DEVICE — GLV SURG BIOGEL LTX PF 6 1/2

## (undated) DEVICE — SUT VIC 0 CT1 36IN J946H

## (undated) DEVICE — SUT VIC 2/0 X1 27IN J459H

## (undated) DEVICE — GLND KWIRE
Type: IMPLANTABLE DEVICE | Site: SHOULDER | Status: NON-FUNCTIONAL
Brand: EQUINOXE
Removed: 2025-02-27

## (undated) DEVICE — 450 ML BOTTLE OF 0.05% CHLORHEXIDINE GLUCONATE IN 99.95% STERILE WATER FOR IRRIGATION, USP AND APPLICATOR.: Brand: IRRISEPT ANTIMICROBIAL WOUND LAVAGE

## (undated) DEVICE — SUT ETHIB 2 CV V37 MS/4 30IN MX69G

## (undated) DEVICE — MAT FLR ABSORBENT LG 4FT 10 2.5FT